# Patient Record
Sex: MALE | Race: WHITE | Employment: FULL TIME | URBAN - METROPOLITAN AREA
[De-identification: names, ages, dates, MRNs, and addresses within clinical notes are randomized per-mention and may not be internally consistent; named-entity substitution may affect disease eponyms.]

---

## 2023-03-02 ENCOUNTER — HOSPITAL ENCOUNTER (INPATIENT)
Age: 58
LOS: 1 days | Discharge: PSYCHIATRIC OR SUBSTANCE USE FACILITY | End: 2023-03-04
Attending: INTERNAL MEDICINE | Admitting: INTERNAL MEDICINE
Payer: COMMERCIAL

## 2023-03-02 DIAGNOSIS — M62.82 NON-TRAUMATIC RHABDOMYOLYSIS: ICD-10-CM

## 2023-03-02 DIAGNOSIS — R45.851 SUICIDAL IDEATION: Primary | ICD-10-CM

## 2023-03-02 LAB
ACETAMINOPHEN LEVEL: <5 UG/ML (ref 10–30)
ALBUMIN SERPL-MCNC: 4.5 G/DL (ref 3.5–4.6)
ALP BLD-CCNC: 83 U/L (ref 35–104)
ALT SERPL-CCNC: 96 U/L (ref 0–41)
AMPHETAMINE SCREEN, URINE: NORMAL
ANION GAP SERPL CALCULATED.3IONS-SCNC: 12 MEQ/L (ref 9–15)
AST SERPL-CCNC: 160 U/L (ref 0–40)
BACTERIA: NEGATIVE /HPF
BARBITURATE SCREEN URINE: NORMAL
BASOPHILS ABSOLUTE: 0 K/UL (ref 0–0.2)
BASOPHILS RELATIVE PERCENT: 0.2 %
BENZODIAZEPINE SCREEN, URINE: NORMAL
BILIRUB SERPL-MCNC: 2.3 MG/DL (ref 0.2–0.7)
BILIRUBIN URINE: ABNORMAL
BLOOD, URINE: NEGATIVE
BUN BLDV-MCNC: 23 MG/DL (ref 6–20)
CALCIUM SERPL-MCNC: 9.5 MG/DL (ref 8.5–9.9)
CANNABINOID SCREEN URINE: NORMAL
CHLORIDE BLD-SCNC: 96 MEQ/L (ref 95–107)
CHOLESTEROL, TOTAL: 142 MG/DL (ref 0–199)
CLARITY: CLEAR
CO2: 28 MEQ/L (ref 20–31)
COCAINE METABOLITE SCREEN URINE: NORMAL
COLOR: ABNORMAL
CREAT SERPL-MCNC: 1.06 MG/DL (ref 0.7–1.2)
EOSINOPHILS ABSOLUTE: 0 K/UL (ref 0–0.7)
EOSINOPHILS RELATIVE PERCENT: 0.3 %
EPITHELIAL CELLS, UA: ABNORMAL /HPF (ref 0–5)
ETHANOL PERCENT: NORMAL G/DL
ETHANOL: <10 MG/DL (ref 0–0.08)
FENTANYL SCREEN, URINE: NORMAL
FINE CASTS, UA: ABNORMAL /LPF (ref 0–5)
GFR SERPL CREATININE-BSD FRML MDRD: >60 ML/MIN/{1.73_M2}
GLOBULIN: 2.8 G/DL (ref 2.3–3.5)
GLUCOSE BLD-MCNC: 107 MG/DL (ref 70–99)
GLUCOSE URINE: NEGATIVE MG/DL
HCT VFR BLD CALC: 46.4 % (ref 42–52)
HDLC SERPL-MCNC: 58 MG/DL (ref 40–59)
HEMOGLOBIN: 15.3 G/DL (ref 14–18)
HYALINE CASTS: ABNORMAL /HPF (ref 0–5)
KETONES, URINE: 40 MG/DL
LDL CHOLESTEROL CALCULATED: 69 MG/DL (ref 0–129)
LEUKOCYTE ESTERASE, URINE: NEGATIVE
LYMPHOCYTES ABSOLUTE: 1.2 K/UL (ref 1–4.8)
LYMPHOCYTES RELATIVE PERCENT: 9.1 %
Lab: NORMAL
MCH RBC QN AUTO: 28.2 PG (ref 27–31.3)
MCHC RBC AUTO-ENTMCNC: 33 % (ref 33–37)
MCV RBC AUTO: 85.5 FL (ref 79–92.2)
METHADONE SCREEN, URINE: NORMAL
MONOCYTES ABSOLUTE: 1.2 K/UL (ref 0.2–0.8)
MONOCYTES RELATIVE PERCENT: 9.7 %
NEUTROPHILS ABSOLUTE: 10.2 K/UL (ref 1.4–6.5)
NEUTROPHILS RELATIVE PERCENT: 80.7 %
NITRITE, URINE: NEGATIVE
OPIATE SCREEN URINE: NORMAL
OXYCODONE URINE: NORMAL
PDW BLD-RTO: 15.1 % (ref 11.5–14.5)
PH UA: 5.5 (ref 5–9)
PHENCYCLIDINE SCREEN URINE: NORMAL
PLATELET # BLD: 242 K/UL (ref 130–400)
POTASSIUM SERPL-SCNC: 3.9 MEQ/L (ref 3.4–4.9)
PROPOXYPHENE SCREEN: NORMAL
PROTEIN UA: 30 MG/DL
RBC # BLD: 5.43 M/UL (ref 4.7–6.1)
RBC UA: ABNORMAL /HPF (ref 0–5)
SALICYLATE, SERUM: <0.3 MG/DL (ref 15–30)
SARS-COV-2, NAAT: NOT DETECTED
SODIUM BLD-SCNC: 136 MEQ/L (ref 135–144)
SPECIFIC GRAVITY UA: 1.03 (ref 1–1.03)
TOTAL CK: 5668 U/L (ref 0–190)
TOTAL PROTEIN: 7.3 G/DL (ref 6.3–8)
TRIGL SERPL-MCNC: 76 MG/DL (ref 0–150)
TSH SERPL DL<=0.05 MIU/L-ACNC: 2.88 UIU/ML (ref 0.44–3.86)
URINE REFLEX TO CULTURE: ABNORMAL
UROBILINOGEN, URINE: 1 E.U./DL
WBC # BLD: 12.7 K/UL (ref 4.8–10.8)
WBC UA: ABNORMAL /HPF (ref 0–5)

## 2023-03-02 PROCEDURE — 80143 DRUG ASSAY ACETAMINOPHEN: CPT

## 2023-03-02 PROCEDURE — 36415 COLL VENOUS BLD VENIPUNCTURE: CPT

## 2023-03-02 PROCEDURE — 80307 DRUG TEST PRSMV CHEM ANLYZR: CPT

## 2023-03-02 PROCEDURE — 82077 ASSAY SPEC XCP UR&BREATH IA: CPT

## 2023-03-02 PROCEDURE — 84443 ASSAY THYROID STIM HORMONE: CPT

## 2023-03-02 PROCEDURE — 80053 COMPREHEN METABOLIC PANEL: CPT

## 2023-03-02 PROCEDURE — 80179 DRUG ASSAY SALICYLATE: CPT

## 2023-03-02 PROCEDURE — 85025 COMPLETE CBC W/AUTO DIFF WBC: CPT

## 2023-03-02 PROCEDURE — 80061 LIPID PANEL: CPT

## 2023-03-02 PROCEDURE — 87635 SARS-COV-2 COVID-19 AMP PRB: CPT

## 2023-03-02 PROCEDURE — 99285 EMERGENCY DEPT VISIT HI MDM: CPT

## 2023-03-02 PROCEDURE — 82550 ASSAY OF CK (CPK): CPT

## 2023-03-02 PROCEDURE — 81001 URINALYSIS AUTO W/SCOPE: CPT

## 2023-03-02 RX ORDER — BACITRACIN ZINC 500 [USP'U]/G
OINTMENT TOPICAL ONCE
Status: COMPLETED | OUTPATIENT
Start: 2023-03-02 | End: 2023-03-03

## 2023-03-02 RX ORDER — 0.9 % SODIUM CHLORIDE 0.9 %
2000 INTRAVENOUS SOLUTION INTRAVENOUS ONCE
Status: COMPLETED | OUTPATIENT
Start: 2023-03-02 | End: 2023-03-03

## 2023-03-02 ASSESSMENT — PATIENT HEALTH QUESTIONNAIRE - PHQ9: SUM OF ALL RESPONSES TO PHQ QUESTIONS 1-9: 9

## 2023-03-02 ASSESSMENT — LIFESTYLE VARIABLES
HOW MANY STANDARD DRINKS CONTAINING ALCOHOL DO YOU HAVE ON A TYPICAL DAY: 1 OR 2
HOW OFTEN DO YOU HAVE A DRINK CONTAINING ALCOHOL: 2-4 TIMES A MONTH

## 2023-03-02 ASSESSMENT — ENCOUNTER SYMPTOMS
ANAL BLEEDING: 0
NAUSEA: 0
VOICE CHANGE: 0
SHORTNESS OF BREATH: 0
COUGH: 0
ABDOMINAL DISTENTION: 0
VOMITING: 0
EYE DISCHARGE: 0

## 2023-03-02 ASSESSMENT — PAIN - FUNCTIONAL ASSESSMENT: PAIN_FUNCTIONAL_ASSESSMENT: NONE - DENIES PAIN

## 2023-03-03 PROBLEM — M62.82 RHABDOMYOLYSIS: Status: ACTIVE | Noted: 2023-03-03

## 2023-03-03 LAB
EKG ATRIAL RATE: 74 BPM
EKG P AXIS: 36 DEGREES
EKG P-R INTERVAL: 118 MS
EKG Q-T INTERVAL: 410 MS
EKG QRS DURATION: 102 MS
EKG QTC CALCULATION (BAZETT): 455 MS
EKG R AXIS: 17 DEGREES
EKG T AXIS: 32 DEGREES
EKG VENTRICULAR RATE: 74 BPM
TOTAL CK: 2385 U/L (ref 0–190)
TOTAL CK: 3271 U/L (ref 0–190)

## 2023-03-03 PROCEDURE — 2060000000 HC ICU INTERMEDIATE R&B

## 2023-03-03 PROCEDURE — 96360 HYDRATION IV INFUSION INIT: CPT

## 2023-03-03 PROCEDURE — 6360000002 HC RX W HCPCS: Performed by: INTERNAL MEDICINE

## 2023-03-03 PROCEDURE — 6370000000 HC RX 637 (ALT 250 FOR IP): Performed by: PHYSICIAN ASSISTANT

## 2023-03-03 PROCEDURE — 82550 ASSAY OF CK (CPK): CPT

## 2023-03-03 PROCEDURE — 96361 HYDRATE IV INFUSION ADD-ON: CPT

## 2023-03-03 PROCEDURE — 36415 COLL VENOUS BLD VENIPUNCTURE: CPT

## 2023-03-03 PROCEDURE — 2580000003 HC RX 258: Performed by: PHYSICIAN ASSISTANT

## 2023-03-03 PROCEDURE — 93005 ELECTROCARDIOGRAM TRACING: CPT | Performed by: INTERNAL MEDICINE

## 2023-03-03 PROCEDURE — 2580000003 HC RX 258: Performed by: INTERNAL MEDICINE

## 2023-03-03 RX ORDER — SODIUM CHLORIDE 9 MG/ML
INJECTION, SOLUTION INTRAVENOUS PRN
Status: DISCONTINUED | OUTPATIENT
Start: 2023-03-03 | End: 2023-03-04 | Stop reason: HOSPADM

## 2023-03-03 RX ORDER — 0.9 % SODIUM CHLORIDE 0.9 %
2000 INTRAVENOUS SOLUTION INTRAVENOUS ONCE
Status: COMPLETED | OUTPATIENT
Start: 2023-03-03 | End: 2023-03-03

## 2023-03-03 RX ORDER — ONDANSETRON 2 MG/ML
4 INJECTION INTRAMUSCULAR; INTRAVENOUS EVERY 6 HOURS PRN
Status: DISCONTINUED | OUTPATIENT
Start: 2023-03-03 | End: 2023-03-04 | Stop reason: HOSPADM

## 2023-03-03 RX ORDER — ACETAMINOPHEN 650 MG/1
650 SUPPOSITORY RECTAL EVERY 6 HOURS PRN
Status: DISCONTINUED | OUTPATIENT
Start: 2023-03-03 | End: 2023-03-04 | Stop reason: HOSPADM

## 2023-03-03 RX ORDER — ENOXAPARIN SODIUM 100 MG/ML
40 INJECTION SUBCUTANEOUS DAILY
Status: DISCONTINUED | OUTPATIENT
Start: 2023-03-03 | End: 2023-03-04 | Stop reason: HOSPADM

## 2023-03-03 RX ORDER — ACETAMINOPHEN 325 MG/1
650 TABLET ORAL EVERY 6 HOURS PRN
Status: DISCONTINUED | OUTPATIENT
Start: 2023-03-03 | End: 2023-03-04 | Stop reason: HOSPADM

## 2023-03-03 RX ORDER — POLYETHYLENE GLYCOL 3350 17 G/17G
17 POWDER, FOR SOLUTION ORAL DAILY PRN
Status: DISCONTINUED | OUTPATIENT
Start: 2023-03-03 | End: 2023-03-04 | Stop reason: HOSPADM

## 2023-03-03 RX ORDER — ONDANSETRON 4 MG/1
4 TABLET, ORALLY DISINTEGRATING ORAL EVERY 8 HOURS PRN
Status: DISCONTINUED | OUTPATIENT
Start: 2023-03-03 | End: 2023-03-04 | Stop reason: HOSPADM

## 2023-03-03 RX ORDER — SODIUM CHLORIDE 0.9 % (FLUSH) 0.9 %
5-40 SYRINGE (ML) INJECTION EVERY 12 HOURS SCHEDULED
Status: DISCONTINUED | OUTPATIENT
Start: 2023-03-03 | End: 2023-03-04 | Stop reason: HOSPADM

## 2023-03-03 RX ORDER — SODIUM CHLORIDE 0.9 % (FLUSH) 0.9 %
5-40 SYRINGE (ML) INJECTION PRN
Status: DISCONTINUED | OUTPATIENT
Start: 2023-03-03 | End: 2023-03-04 | Stop reason: HOSPADM

## 2023-03-03 RX ORDER — SODIUM CHLORIDE 9 MG/ML
INJECTION, SOLUTION INTRAVENOUS CONTINUOUS
Status: DISCONTINUED | OUTPATIENT
Start: 2023-03-03 | End: 2023-03-04 | Stop reason: HOSPADM

## 2023-03-03 RX ADMIN — SODIUM CHLORIDE: 9 INJECTION, SOLUTION INTRAVENOUS at 13:19

## 2023-03-03 RX ADMIN — ENOXAPARIN SODIUM 40 MG: 100 INJECTION SUBCUTANEOUS at 18:56

## 2023-03-03 RX ADMIN — SODIUM CHLORIDE: 9 INJECTION, SOLUTION INTRAVENOUS at 21:05

## 2023-03-03 RX ADMIN — SODIUM CHLORIDE 2000 ML: 9 INJECTION, SOLUTION INTRAVENOUS at 04:05

## 2023-03-03 RX ADMIN — Medication 10 ML: at 21:05

## 2023-03-03 RX ADMIN — BACITRACIN ZINC: 500 OINTMENT TOPICAL at 04:04

## 2023-03-03 RX ADMIN — SODIUM CHLORIDE 2000 ML: 9 INJECTION, SOLUTION INTRAVENOUS at 00:07

## 2023-03-03 ASSESSMENT — LIFESTYLE VARIABLES
HOW OFTEN DO YOU HAVE A DRINK CONTAINING ALCOHOL: MONTHLY OR LESS
HOW MANY STANDARD DRINKS CONTAINING ALCOHOL DO YOU HAVE ON A TYPICAL DAY: 1 OR 2

## 2023-03-03 NOTE — PROGRESS NOTES
Nutrition Note    Trigger received for home PN/EN, data entered in error.   No assessment indicated, follow per LOS protocol    Electronically signed by Gracia Rubin RD, LD on 3/3/23 at 11:32 AM EST

## 2023-03-03 NOTE — ED NOTES
Pt laying in bed with eyes closed. Easily arousable. No acute distress noted. Resps even non labored. Skin p/w/d.  1:1 remains at bedside. No needs at this time.      Angel Perez RN  03/03/23 4481

## 2023-03-03 NOTE — ED NOTES
Spoke to [facility] [employer] PA about case. Reuqesting updated, going medical vs CK under 1k, so nurse may dispo with doctor when he comes on. States she will review and get back.      Agapito Silvestre RN  03/03/23 9054

## 2023-03-03 NOTE — ED NOTES
Patient changed into psych safe clothing. Urine and COVID obtained and sent to lab. Skin and cobtraband check performed. Contraband check negative at this time. Lab called to draw blood.      Teresa Alvarez RN  03/02/23 0970
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
## 2023-03-03 NOTE — ACP (ADVANCE CARE PLANNING)
Advance Care Planning     Advance Care Planning Inpatient Note  [facility] Department    Today's Date: 3/3/2023  Unit: MLOZ 1W TELEMETRY    Received request from admission screening and patient. Upon review of chart and communication with care team, patient's decision making abilities are not in question. . Patient was/were present in the room during visit. Goals of ACP Conversation:  Discuss advance care planning documents    Health Care Decision Makers:       Primary Decision Maker: ReidBeenaKami Edward - Spouse - [phone]    Secondary Decision Maker: Oscar Curtis - Parent - [phone]  Summary:  Verified Healthcare Decision Maker    Advance Care Planning Documents (Patient Wishes):  Healthcare Power of /Advance Directive Appointment of Health Care Agent  Living Will/Advance Directive     Assessment: This patient completed his [address] during this hospitalization. He named his wife, Rodney Antonoi, as his primary medical decision maker. A copy of the document was retained for scanning into Epic. A copy and the original were returned to the patient. Interventions:  Provided education on documents for clarity and greater understanding  Discussed and provided education on state decision maker hierarchy  Assisted in the completion of documents according to patient's wishes at this time  Encouraged ongoing ACP conversation with future decision makers and loved ones    Care Preferences Communicated:   No    Outcomes/Plan:  New advance directive completed. Returned original document(s) to patient, as well as copies for distribution to appointed agents  Copy of advance directive given to staff to scan into medical record. Routed ACP note to attending provider or other IDT member.     Electronically signed by Mae Cornejo, [address] on 3/3/2023 at 2:09 PM

## 2023-03-03 NOTE — ED NOTES
Patient resting quietly. Respirations are even and unlabored. No distress noted at this time.      Markos Villafana RN  03/02/23 0469

## 2023-03-03 NOTE — ED NOTES
Pleasant and cooperative with staff interaction. Open to treatment and recommendations.       Alex Thompson RN  03/02/23 7750
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
## 2023-03-03 NOTE — ED NOTES
Pt is laying in bed sleeping at this time. No acute distress noted. Easily arousable. Resps even non labored. Skin p/w/d.  1:1 remains at bedside. No needs at this time.      Reny Avila RN  03/03/23 7666

## 2023-03-03 NOTE — ED NOTES
Pt is awake. Laying calmly in bed. Pt has a sitter at bedside      Lalitha Lyon RN  03/03/23 8185
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
## 2023-03-04 ENCOUNTER — HOSPITAL ENCOUNTER (INPATIENT)
Age: 58
LOS: 10 days | Discharge: HOME | DRG: 885 | End: 2023-03-14
Attending: PSYCHIATRY & NEUROLOGY | Admitting: PSYCHIATRY & NEUROLOGY
Payer: COMMERCIAL

## 2023-03-04 VITALS
BODY MASS INDEX: 29.41 KG/M2 | HEART RATE: 74 BPM | SYSTOLIC BLOOD PRESSURE: 142 MMHG | RESPIRATION RATE: 18 BRPM | WEIGHT: 183 LBS | OXYGEN SATURATION: 97 % | DIASTOLIC BLOOD PRESSURE: 80 MMHG | TEMPERATURE: 98.8 F | HEIGHT: 66 IN

## 2023-03-04 PROBLEM — F32.2 CURRENT SEVERE EPISODE OF MAJOR DEPRESSIVE DISORDER WITHOUT PSYCHOTIC FEATURES, UNSPECIFIED WHETHER RECURRENT (HCC): Status: ACTIVE | Noted: 2023-03-04

## 2023-03-04 LAB
ANION GAP SERPL CALCULATED.3IONS-SCNC: 6 MEQ/L (ref 9–15)
BUN BLDV-MCNC: 13 MG/DL (ref 6–20)
CALCIUM SERPL-MCNC: 7.9 MG/DL (ref 8.5–9.9)
CHLORIDE BLD-SCNC: 107 MEQ/L (ref 95–107)
CO2: 26 MEQ/L (ref 20–31)
CREAT SERPL-MCNC: 0.82 MG/DL (ref 0.7–1.2)
GFR SERPL CREATININE-BSD FRML MDRD: >60 ML/MIN/{1.73_M2}
GLUCOSE BLD-MCNC: 102 MG/DL (ref 70–99)
POTASSIUM SERPL-SCNC: 3.4 MEQ/L (ref 3.4–4.9)
SODIUM BLD-SCNC: 139 MEQ/L (ref 135–144)
TOTAL CK: 1230 U/L (ref 0–190)

## 2023-03-04 PROCEDURE — 80048 BASIC METABOLIC PNL TOTAL CA: CPT

## 2023-03-04 PROCEDURE — 2580000003 HC RX 258: Performed by: INTERNAL MEDICINE

## 2023-03-04 PROCEDURE — 1240000000 HC EMOTIONAL WELLNESS R&B

## 2023-03-04 PROCEDURE — 82550 ASSAY OF CK (CPK): CPT

## 2023-03-04 PROCEDURE — 6360000002 HC RX W HCPCS: Performed by: INTERNAL MEDICINE

## 2023-03-04 PROCEDURE — 36415 COLL VENOUS BLD VENIPUNCTURE: CPT

## 2023-03-04 RX ORDER — ENOXAPARIN SODIUM 100 MG/ML
40 INJECTION SUBCUTANEOUS DAILY
Status: CANCELLED | OUTPATIENT
Start: 2023-03-04

## 2023-03-04 RX ORDER — SODIUM CHLORIDE 0.9 % (FLUSH) 0.9 %
5-40 SYRINGE (ML) INJECTION PRN
Status: DISCONTINUED | OUTPATIENT
Start: 2023-03-04 | End: 2023-03-04

## 2023-03-04 RX ORDER — SODIUM CHLORIDE 0.9 % (FLUSH) 0.9 %
5-40 SYRINGE (ML) INJECTION PRN
Status: CANCELLED | OUTPATIENT
Start: 2023-03-04

## 2023-03-04 RX ORDER — ACETAMINOPHEN 325 MG/1
650 TABLET ORAL EVERY 4 HOURS PRN
Status: CANCELLED | OUTPATIENT
Start: 2023-03-04

## 2023-03-04 RX ORDER — SODIUM CHLORIDE 0.9 % (FLUSH) 0.9 %
5-40 SYRINGE (ML) INJECTION EVERY 12 HOURS SCHEDULED
Status: CANCELLED | OUTPATIENT
Start: 2023-03-04

## 2023-03-04 RX ORDER — HALOPERIDOL 5 MG/1
5 TABLET ORAL EVERY 4 HOURS PRN
Status: DISCONTINUED | OUTPATIENT
Start: 2023-03-04 | End: 2023-03-14 | Stop reason: HOSPADM

## 2023-03-04 RX ORDER — ONDANSETRON 4 MG/1
4 TABLET, ORALLY DISINTEGRATING ORAL EVERY 8 HOURS PRN
Status: DISCONTINUED | OUTPATIENT
Start: 2023-03-04 | End: 2023-03-14 | Stop reason: HOSPADM

## 2023-03-04 RX ORDER — ONDANSETRON 4 MG/1
4 TABLET, ORALLY DISINTEGRATING ORAL EVERY 8 HOURS PRN
Status: CANCELLED | OUTPATIENT
Start: 2023-03-04

## 2023-03-04 RX ORDER — POLYETHYLENE GLYCOL 3350 17 G/17G
17 POWDER, FOR SOLUTION ORAL DAILY PRN
Status: DISCONTINUED | OUTPATIENT
Start: 2023-03-04 | End: 2023-03-09

## 2023-03-04 RX ORDER — ACETAMINOPHEN 325 MG/1
650 TABLET ORAL EVERY 4 HOURS PRN
Status: DISCONTINUED | OUTPATIENT
Start: 2023-03-04 | End: 2023-03-14 | Stop reason: HOSPADM

## 2023-03-04 RX ORDER — HALOPERIDOL 5 MG/1
5 TABLET ORAL EVERY 4 HOURS PRN
Status: CANCELLED | OUTPATIENT
Start: 2023-03-04

## 2023-03-04 RX ORDER — TRAZODONE HYDROCHLORIDE 50 MG/1
50 TABLET ORAL NIGHTLY PRN
Status: CANCELLED | OUTPATIENT
Start: 2023-03-04

## 2023-03-04 RX ORDER — POLYETHYLENE GLYCOL 3350 17 G/17G
17 POWDER, FOR SOLUTION ORAL DAILY PRN
Status: DISCONTINUED | OUTPATIENT
Start: 2023-03-04 | End: 2023-03-14 | Stop reason: HOSPADM

## 2023-03-04 RX ORDER — HYDROXYZINE HYDROCHLORIDE 25 MG/1
50 TABLET, FILM COATED ORAL 3 TIMES DAILY PRN
Status: DISCONTINUED | OUTPATIENT
Start: 2023-03-04 | End: 2023-03-14 | Stop reason: HOSPADM

## 2023-03-04 RX ORDER — HALOPERIDOL 5 MG/ML
5 INJECTION INTRAMUSCULAR EVERY 4 HOURS PRN
Status: CANCELLED | OUTPATIENT
Start: 2023-03-04

## 2023-03-04 RX ORDER — ACETAMINOPHEN 650 MG/1
650 SUPPOSITORY RECTAL EVERY 6 HOURS PRN
Status: DISCONTINUED | OUTPATIENT
Start: 2023-03-04 | End: 2023-03-09

## 2023-03-04 RX ORDER — HALOPERIDOL 5 MG/ML
5 INJECTION INTRAMUSCULAR EVERY 4 HOURS PRN
Status: DISCONTINUED | OUTPATIENT
Start: 2023-03-04 | End: 2023-03-14 | Stop reason: HOSPADM

## 2023-03-04 RX ORDER — ONDANSETRON 2 MG/ML
4 INJECTION INTRAMUSCULAR; INTRAVENOUS EVERY 6 HOURS PRN
Status: DISCONTINUED | OUTPATIENT
Start: 2023-03-04 | End: 2023-03-09

## 2023-03-04 RX ORDER — ONDANSETRON 2 MG/ML
4 INJECTION INTRAMUSCULAR; INTRAVENOUS EVERY 6 HOURS PRN
Status: CANCELLED | OUTPATIENT
Start: 2023-03-04

## 2023-03-04 RX ORDER — ACETAMINOPHEN 325 MG/1
650 TABLET ORAL EVERY 6 HOURS PRN
Status: DISCONTINUED | OUTPATIENT
Start: 2023-03-04 | End: 2023-03-09

## 2023-03-04 RX ORDER — SODIUM CHLORIDE 0.9 % (FLUSH) 0.9 %
5-40 SYRINGE (ML) INJECTION EVERY 12 HOURS SCHEDULED
Status: DISCONTINUED | OUTPATIENT
Start: 2023-03-04 | End: 2023-03-04

## 2023-03-04 RX ORDER — ACETAMINOPHEN 650 MG/1
650 SUPPOSITORY RECTAL EVERY 6 HOURS PRN
Status: CANCELLED | OUTPATIENT
Start: 2023-03-04

## 2023-03-04 RX ORDER — NICOTINE 21 MG/24HR
1 PATCH, TRANSDERMAL 24 HOURS TRANSDERMAL DAILY
Status: CANCELLED | OUTPATIENT
Start: 2023-03-04

## 2023-03-04 RX ORDER — NICOTINE 21 MG/24HR
1 PATCH, TRANSDERMAL 24 HOURS TRANSDERMAL DAILY
Status: DISCONTINUED | OUTPATIENT
Start: 2023-03-04 | End: 2023-03-04

## 2023-03-04 RX ORDER — TRAZODONE HYDROCHLORIDE 50 MG/1
50 TABLET ORAL NIGHTLY PRN
Status: DISCONTINUED | OUTPATIENT
Start: 2023-03-04 | End: 2023-03-14 | Stop reason: HOSPADM

## 2023-03-04 RX ORDER — ACETAMINOPHEN 325 MG/1
650 TABLET ORAL EVERY 6 HOURS PRN
Status: CANCELLED | OUTPATIENT
Start: 2023-03-04

## 2023-03-04 RX ORDER — HYDROXYZINE HYDROCHLORIDE 25 MG/1
50 TABLET, FILM COATED ORAL 3 TIMES DAILY PRN
Status: CANCELLED | OUTPATIENT
Start: 2023-03-04

## 2023-03-04 RX ORDER — POLYETHYLENE GLYCOL 3350 17 G/17G
17 POWDER, FOR SOLUTION ORAL DAILY PRN
Status: CANCELLED | OUTPATIENT
Start: 2023-03-04

## 2023-03-04 RX ORDER — ENOXAPARIN SODIUM 100 MG/ML
40 INJECTION SUBCUTANEOUS DAILY
Status: DISCONTINUED | OUTPATIENT
Start: 2023-03-05 | End: 2023-03-06

## 2023-03-04 RX ADMIN — Medication 10 ML: at 09:26

## 2023-03-04 RX ADMIN — ENOXAPARIN SODIUM 40 MG: 100 INJECTION SUBCUTANEOUS at 08:41

## 2023-03-04 RX ADMIN — SODIUM CHLORIDE: 9 INJECTION, SOLUTION INTRAVENOUS at 03:51

## 2023-03-04 ASSESSMENT — SLEEP AND FATIGUE QUESTIONNAIRES
SLEEP PATTERN: DIFFICULTY FALLING ASLEEP;DISTURBED/INTERRUPTED SLEEP
AVERAGE NUMBER OF SLEEP HOURS: 6
DO YOU HAVE DIFFICULTY SLEEPING: YES
DO YOU USE A SLEEP AID: NO

## 2023-03-04 NOTE — DISCHARGE SUMMARY
Discharge Summary    Date: 3/4/2023  Patient Name: Marla Gonsales    YOB: 1965     Age: 56 y.o. Admit Date: 3/2/2023  Discharge Date: 3/4/2023  Discharge Condition: [address]    Admission Diagnosis  Rhabdomyolysis [M62.82]; Suicidal ideation [R45.851]; Non-traumatic rhabdomyolysis [M62.82]      Discharge Diagnosis  Principal Problem:    Rhabdomyolysis  Resolved Problems:    * No resolved hospital problems. [facility] Stay  Narrative of Hospital Course:  She comes with suicidal ideation and suicide attempts. Did receive IV fluids here for rhabdo. Laly July is improving. Patient taking sufficient amount of oral intake. Pt can be discharged to psych    Consultants:  [facility] Sheri Hancock  IP CONSULT TO PSYCHIATRY  IP CONSULT TO PSYCHIATRY    Surgeries/procedures Performed:      Treatments:            Discharge Plan/Disposition:  Home    Hospital/Incidental Findings Requiring Follow Up:    Patient Instructions:    Diet:    Activity:  For number of days (if applicable): Other Instructions:    Provider Follow-Up:   No follow-ups on file.      Significant Diagnostic Studies:    Recent Labs:  Admission on 03/02/2023  Acetaminophen Level                           Date: 03/02/2023  Value: <5 (A)      Ref range: 10 - 30 ug/mL      Status: Final  WBC                                           Date: 03/02/2023  Value: 12.7 (A)    Ref range: 4.8 - 10.8 K/uL    Status: Final  RBC                                           Date: 03/02/2023  Value: 5.43        Ref range: 4.70 - 6.10 M/uL   Status: Final  Hemoglobin                                    Date: 03/02/2023  Value: 15.3        Ref range: 14.0 - 18.0 g/dL   Status: Final  Hematocrit                                    Date: 03/02/2023  Value: 46.4        Ref range: 42.0 - 52.0 %      Status: Final  MCV                                           Date: 03/02/2023  Value: 85.5        Ref range: 79.0 - 92.2 fL     Status: Final  MCH Date: 03/02/2023  Value: 28.2        Ref range: 27.0 - 31.3 pg     Status: Final  MCHC                                          Date: 03/02/2023  Value: 33.0        Ref range: 33.0 - 37.0 %      Status: Final  RDW                                           Date: 03/02/2023  Value: 15.1 (A)    Ref range: 11.5 - 14.5 %      Status: Final  Platelets                                     Date: 03/02/2023  Value: 242         Ref range: 130 - 400 K/uL     Status: Final  Neutrophils %                                 Date: 03/02/2023  Value: 80.7        Ref range: %                  Status: Final  Lymphocytes %                                 Date: 03/02/2023  Value: 9.1         Ref range: %                  Status: Final  Monocytes %                                   Date: 03/02/2023  Value: 9.7         Ref range: %                  Status: Final  Eosinophils %                                 Date: 03/02/2023  Value: 0.3         Ref range: %                  Status: Final  Basophils %                                   Date: 03/02/2023  Value: 0.2         Ref range: %                  Status: Final  Neutrophils Absolute                          Date: 03/02/2023  Value: 10.2 (A)    Ref range: 1.4 - 6.5 K/uL     Status: Final  Lymphocytes Absolute                          Date: 03/02/2023  Value: 1.2         Ref range: 1.0 - 4.8 K/uL     Status: Final  Monocytes Absolute                            Date: 03/02/2023  Value: 1.2 (A)     Ref range: 0.2 - 0.8 K/uL     Status: Final  Eosinophils Absolute                          Date: 03/02/2023  Value: 0.0         Ref range: 0.0 - 0.7 K/uL     Status: Final  Basophils Absolute                            Date: 03/02/2023  Value: 0.0         Ref range: 0.0 - 0.2 K/uL     Status: Final  Total CK                                      Date: 03/02/2023  Value: 5,668 (A)   Ref range: 0 - 190 U/L        Status: Final  Sodium                                        Date: 03/02/2023  Value: 136         Ref range: 135 - 144 mEq/L    Status: Final  Potassium                                     Date: 03/02/2023  Value: 3.9         Ref range: 3.4 - 4.9 mEq/L    Status: Final  Chloride                                      Date: 03/02/2023  Value: 96          Ref range: 95 - 107 mEq/L     Status: Final  CO2                                           Date: 03/02/2023  Value: 28          Ref range: 20 - 31 mEq/L      Status: Final  Anion Gap                                     Date: 03/02/2023  Value: 12          Ref range: 9 - 15 mEq/L       Status: Final  Glucose                                       Date: 03/02/2023  Value: 107 (A)     Ref range: 70 - 99 mg/dL      Status: Final  BUN                                           Date: 03/02/2023  Value: 23 (A)      Ref range: 6 - 20 mg/dL       Status: Final  Creatinine                                    Date: 03/02/2023  Value: 1.06        Ref range: 0.70 - 1.20 mg/dL  Status: Final  Est, Glom Filt Rate                           Date: 03/02/2023  Value: >60.0       Ref range: >60                Status: Final                Comment: Pediatric calculator link  [email or web address]/gfr_calculatorped  Effective Oct 3, 2022  These results are not intended for use in patients  <25years of age. eGFR results are calculated without  a race factor using the 2021 CKD-EPI equation. Careful  clinical correlation is recommended, particularly when  comparing to results calculated using previous equations. The CKD-EPI equation is less accurate in patients with  extremes of muscle mass, extra-renal metabolism of  creatinine, excessive creatinine ingestion, or following  therapy that affects renal tubular secretion.     Calcium                                       Date: 03/02/2023  Value: 9.5         Ref range: 8.5 - 9.9 mg/dL    Status: Final  Total Protein                                 Date: 03/02/2023  Value: 7.3         Ref range: 6.3 - 8.0 g/dL     Status: Final  Albumin                                       Date: 03/02/2023  Value: 4.5         Ref range: 3.5 - 4.6 g/dL     Status: Final  Total Bilirubin                               Date: 03/02/2023  Value: 2.3 (A)     Ref range: 0.2 - 0.7 mg/dL    Status: Final  Alkaline Phosphatase                          Date: 03/02/2023  Value: 83          Ref range: 35 - 104 U/L       Status: Final  ALT                                           Date: 03/02/2023  Value: 96 (A)      Ref range: 0 - 41 U/L         Status: Final  AST                                           Date: 03/02/2023  Value: 160 (A)     Ref range: 0 - 40 U/L         Status: Final  Globulin                                      Date: 03/02/2023  Value: 2.8         Ref range: 2.3 - 3.5 g/dL     Status: Final  SARS-CoV-2, NAAT                              Date: 03/02/2023  Value: Not Detected                     Ref range: Not Detected       Status: Final                Comment: Rapid NAAT:   Negative results should be treated as presumptive and,  if inconsistent with clinical signs and symptoms or necessary for  patient management, should be tested with an alternative molecular  assay. Negative results do not preclude SARS-CoV-2 infection and  should not be used as the sole basis for patient management decisions. This test has been authorized by the FDA under an Emergency Use  Authorization (EUA) for use by authorized laboratories.     Fact sheet for Healthcare Providers:  http://www.weston.shira/  Fact sheet for Patients: http://www.[facility]on.shira/    METHODOLOGY: Isothermal Nucleic Acid Amplification    Ventricular Rate                              Date: 03/03/2023  Value: 74          Ref range: BPM                Status: Final  Atrial Rate                                   Date: 03/03/2023  Value: 74          Ref range: BPM                Status: Final  P-R Interval Date: 03/03/2023  Value: 118         Ref range: ms                 Status: Final  QRS Duration                                  Date: 03/03/2023  Value: 102         Ref range: ms                 Status: Final  Q-T Interval                                  Date: 03/03/2023  Value: 410         Ref range: ms                 Status: Final  QTc Calculation (Bazett)                      Date: 03/03/2023  Value: 455         Ref range: ms                 Status: Final  P Axis                                        Date: 03/03/2023  Value: 36          Ref range: degrees            Status: Final  R Axis                                        Date: 03/03/2023  Value: 17          Ref range: degrees            Status: Final  T Axis                                        Date: 03/03/2023  Value: 32          Ref range: degrees            Status: Final  Ethanol Lvl                                   Date: 03/02/2023  Value: <10         Ref range: mg/dL              Status: Final  Ethanol percent                               Date: 03/02/2023  Value: Not indicated                     Ref range: G/dL               Status: Final  Color, UA                                     Date: 03/02/2023  Value: DARK YELLOW (A) Ref range: Straw/Yellow       Status: Final  Clarity, UA                                   Date: 03/02/2023  Value: Clear       Ref range: Clear              Status: Final  Glucose, Ur                                   Date: 03/02/2023  Value: Negative    Ref range: Negative mg/dL     Status: Final  Bilirubin Urine                               Date: 03/02/2023  Value: MODERATE (A) Ref range: Negative           Status: Final  Ketones, Urine                                Date: 03/02/2023  Value: 40 (A)      Ref range: Negative mg/dL     Status: Final  Specific Tripp, UA                          Date: 03/02/2023  Value: 1.034       Ref range: 1.005 - 1.030      Status: Final  Blood, Urine Date: 03/02/2023  Value: Negative    Ref range: Negative           Status: Final  pH, UA                                        Date: 03/02/2023  Value: 5.5         Ref range: 5.0 - 9.0          Status: Final  Protein, UA                                   Date: 03/02/2023  Value: 30 (A)      Ref range: Negative mg/dL     Status: Final  Urobilinogen, Urine                           Date: 03/02/2023  Value: 1.0         Ref range: <2.0 E.U./dL       Status: Final  Nitrite, Urine                                Date: 03/02/2023  Value: Negative    Ref range: Negative           Status: Final  Leukocyte Esterase, Urine                     Date: 03/02/2023  Value: Negative    Ref range: Negative           Status: Final  Urine Reflex to Culture                       Date: 03/02/2023  Value: Not Indicated                       Status: Final  Amphetamine Screen, Urine                     Date: 03/02/2023  Value: Neg         Ref range: Negative <1000 n*  Status: Final  Barbiturate Screen, Ur                        Date: 03/02/2023  Value: Neg         Ref range: Negative < 200 n*  Status: Final  Benzodiazepine Screen, Urine                  Date: 03/02/2023  Value: Neg         Ref range: Negative < 200 n*  Status: Final  Cannabinoid Scrn, Ur                          Date: 03/02/2023  Value: Neg         Ref range: Negative < 50 ng*  Status: Final  Cocaine Metabolite Screen, Urine              Date: 03/02/2023  Value: Neg         Ref range: Negative < 300 n*  Status: Final  Opiate Scrn, Ur                               Date: 03/02/2023  Value: Neg         Ref range: Negative < 300 n*  Status: Final  PCP Screen, Urine                             Date: 03/02/2023  Value: Neg         Ref range: Negative < 25 ng*  Status: Final  Methadone Screen, Urine                       Date: 03/02/2023  Value: Neg         Ref range: Negative <300 ng*  Status: Final  Propoxyphene Scrn, Ur                         Date: 03/02/2023  Value: Neg Ref range: Negative <300 ng*  Status: Final  Oxycodone Urine                               Date: 03/02/2023  Value: Neg         Ref range: Negative <100 ng*  Status: Final  FENTANYL SCREEN, URINE                        Date: 03/02/2023  Value: Neg         Ref range: Negative < 50 ng*  Status: Final  Drug Screen Comment:                          Date: 03/02/2023  Value: see below     Status: Final                Comment: This method is a screening test to detect only these drug  classes as part of a medical workup. Confirmatory testing  by another method should be ordered if clinically indicated. TSH                                           Date: 03/02/2023  Value: 2.880       Ref range: 0.440 - 3.860 uI*  Status: Final  Salicylate, Serum                             Date: 03/02/2023  Value: <0.3 (A)    Ref range: 15.0 - 30.0 mg/dL  Status: Final                Comment: Anti-pyretic:  3.0-10.0 mg/dL  Anti-inflammatory:  15.0-30.0 mg/dL  Toxic: >30.0 mg/dL    Cholesterol, Total                            Date: 03/02/2023  Value: 142         Ref range: 0 - 199 mg/dL      Status: Final                Comment: ATP III Cholesterol classification is Desirable. Triglycerides                                 Date: 03/02/2023  Value: 76          Ref range: 0 - 150 mg/dL      Status: Final                Comment: ATP III Triglycerides Classification is Normal.  HDL                                           Date: 03/02/2023  Value: 58          Ref range: 40 - 59 mg/dL      Status: Final                Comment: ATP III HDL Cholesterol Classification is Desirable. Expected Values:    Males:    >55 = No Risk            35-55 = Moderate Risk            <35 = High Risk    Females:  >65 = No Risk            45-65 = Moderate Risk            <45 = High Risk    NCEP Guidelines:   Third Report May 2001  >59 = negative risk factor for CHD  <40 = major risk factor for CHD    LDL Calculated                                Date: 03/02/2023  Value: 69          Ref range: 0 - 129 mg/dL      Status: Final                Comment: ATP III LDL Classification is Optimal.  Fine Casts, UA                                Date: 03/02/2023  Value: 1-3         Ref range: 0 - 5 /LPF         Status: Final  Bacteria, UA                                  Date: 03/02/2023  Value: Negative    Ref range: Negative /HPF      Status: Final  Hyaline Casts, UA                             Date: 03/02/2023  Value: 10-20       Ref range: 0 - 5 /HPF         Status: Final  WBC, UA                                       Date: 03/02/2023  Value: 3-5         Ref range: 0 - 5 /HPF         Status: Final  RBC, UA                                       Date: 03/02/2023  Value: 3-5 (A)     Ref range: 0 - 5 /HPF         Status: Final  Epithelial Cells, UA                          Date: 03/02/2023  Value: 3-5         Ref range: 0 - 5 /HPF         Status: Final  Total CK                                      Date: 03/03/2023  Value: 3,271 (A)   Ref range: 0 - 190 U/L        Status: Final  Total CK                                      Date: 03/03/2023  Value: 2,385 (A)   Ref range: 0 - 190 U/L        Status: Final  Sodium                                        Date: 03/04/2023  Value: 139         Ref range: 135 - 144 mEq/L    Status: Final  Potassium                                     Date: 03/04/2023  Value: 3.4         Ref range: 3.4 - 4.9 mEq/L    Status: Final  Chloride                                      Date: 03/04/2023  Value: 107         Ref range: 95 - 107 mEq/L     Status: Final  CO2                                           Date: 03/04/2023  Value: 26          Ref range: 20 - 31 mEq/L      Status: Final  Anion Gap                                     Date: 03/04/2023  Value: 6 (A)       Ref range: 9 - 15 mEq/L       Status: Final  Glucose                                       Date: 03/04/2023  Value: 102 (A)     Ref range: 70 - 99 mg/dL      Status: Final  BUN Date: 03/04/2023  Value: 13          Ref range: 6 - 20 mg/dL       Status: Final  Creatinine                                    Date: 03/04/2023  Value: 0.82        Ref range: 0.70 - 1.20 mg/dL  Status: Final  Est, [place] Filt Rate                           Date: 03/04/2023  Value: >60.0       Ref range: >60                Status: Final                Comment: Pediatric calculator link  [email or web address]/gfr_calculatorped  Effective Oct 3, 2022  These results are not intended for use in patients  <25years of age. eGFR results are calculated without  a race factor using the 2021 CKD-EPI equation. Careful  clinical correlation is recommended, particularly when  comparing to results calculated using previous equations. The CKD-EPI equation is less accurate in patients with  extremes of muscle mass, extra-renal metabolism of  creatinine, excessive creatinine ingestion, or following  therapy that affects renal tubular secretion. Calcium                                       Date: 03/04/2023  Value: 7.9 (A)     Ref range: 8.5 - 9.9 mg/dL    Status: Final  Total CK                                      Date: 03/04/2023  Value: 1,230 (A)   Ref range: 0 - 190 U/L        Status: Final  ------------    Radiology last 7 days:  No results found. [unfilled]    Discharge Medications    There are no discharge medications for this patient. There are no discharge medications for this patient. There are no discharge medications for this patient. There are no discharge medications for this patient. Time Spent on Discharge:  minutes were spent in patient examination, evaluation, counseling as well as medication reconciliation, prescriptions for required medications, discharge plan, and follow up.     Electronically signed by Ninfa Borrero MD on 3/4/23 at 10:34 AM EST   Overtime on dc summary was 45 min
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
## 2023-03-04 NOTE — FLOWSHEET NOTE
Patient a&ox4 medically stable at this time to transfer to psych as ordered patient in agreement signed voluntary consent verbal report given to oTi Hyman on receiving unit patient quietly sitting in bed 1on1 sitter at bedside police present to assist with transport waiting on transport at this time

## 2023-03-04 NOTE — FLOWSHEET NOTE
Pt admitted to room 182. Pt is a on on one for suicidal ideation. Pt currently denies having any suicidal thoughts. Pt is pleasant and cooperative.
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
## 2023-03-04 NOTE — PLAN OF CARE
Problem: Self Harm/Suicidality  Goal: Will have no self-injury during hospital stay  Description: INTERVENTIONS:  1. Ensure constant observer at bedside with Q15M safety checks  2. Maintain a safe environment  3. Secure patient belongings  4. Ensure family/visitors adhere to safety recommendations  5. Ensure safety tray has been added to patient's diet order  6.   Every shift and PRN: Re-assess suicidal risk via Frequent Screener    Outcome: Progressing     Problem: Discharge Planning  Goal: Discharge to home or other facility with appropriate resources  Outcome: Progressing     Problem: Safety - Adult  Goal: Free from fall injury  Outcome: Progressing

## 2023-03-05 LAB
ANION GAP SERPL CALCULATED.3IONS-SCNC: 8 MEQ/L (ref 9–15)
BUN BLDV-MCNC: 10 MG/DL (ref 6–20)
CALCIUM SERPL-MCNC: 8.5 MG/DL (ref 8.5–9.9)
CHLORIDE BLD-SCNC: 108 MEQ/L (ref 95–107)
CO2: 28 MEQ/L (ref 20–31)
CREAT SERPL-MCNC: 0.81 MG/DL (ref 0.7–1.2)
GFR SERPL CREATININE-BSD FRML MDRD: >60 ML/MIN/{1.73_M2}
GLUCOSE BLD-MCNC: 87 MG/DL (ref 70–99)
POTASSIUM SERPL-SCNC: 3.9 MEQ/L (ref 3.4–4.9)
SODIUM BLD-SCNC: 144 MEQ/L (ref 135–144)
TOTAL CK: 869 U/L (ref 0–190)

## 2023-03-05 PROCEDURE — 80048 BASIC METABOLIC PNL TOTAL CA: CPT

## 2023-03-05 PROCEDURE — 6370000000 HC RX 637 (ALT 250 FOR IP): Performed by: PSYCHIATRY & NEUROLOGY

## 2023-03-05 PROCEDURE — 36415 COLL VENOUS BLD VENIPUNCTURE: CPT

## 2023-03-05 PROCEDURE — 82550 ASSAY OF CK (CPK): CPT

## 2023-03-05 PROCEDURE — 1240000000 HC EMOTIONAL WELLNESS R&B

## 2023-03-05 RX ORDER — PAROXETINE 10 MG/1
10 TABLET, FILM COATED ORAL DAILY
Status: DISCONTINUED | OUTPATIENT
Start: 2023-03-05 | End: 2023-03-07

## 2023-03-05 RX ADMIN — PAROXETINE 10 MG: 10 TABLET, FILM COATED ORAL at 11:00

## 2023-03-05 NOTE — CARE COORDINATION
Brief Intervention and Referral to Treatment Summary    Patient was provided PHQ-9, AUDIT-C and DAST Screening:      PHQ-9 Score: 9  AUDIT-C Score: 1  DAST Score: 0     Patients substance use is considered     Low Risk/Healthy x  Moderate Risk  Harmful  Dependent    Patients depression is considered:     Minimal x  Mild   Moderate  Moderately Severe  Severe    Brief Education Was Provided N/A    Patient was receptive  Patient was not receptive      Brief Intervention Is Provided (Only for AUDIT-C or DAST) N/A    Patient reports readiness to decrease and/or stop use and a plan was discussed   Patient denies readiness to decrease and/or stop use and a plan was not discussed      Recommendations/Referrals for Brief and/or Specialized Treatment Provided to Patient    Patient stated he does not have a problem with drugs or alcohol. As a result, no brief education or intervention was completed.     Electronically signed by Ho Scherer on 3/5/2023 at 10:50 AM
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
## 2023-03-05 NOTE — GROUP NOTE
Group Therapy Note    Date: 3/5/2023    Group Start Time: 1430  Group End Time: 1500  Group Topic: Healthy Living/Wellness    [facility] 3W GABRIELE Dominguez Che, RN        Group Therapy Note    Attendees: 14/19       Patient's Goal:   learn healthy boundaries    Notes:      Status After Intervention:  Unchanged    Participation Level:  Active Listener    Participation Quality: Appropriate      Speech:  normal      Thought Process/Content: Logical      Affective Functioning: Congruent      Mood: euthymic      Level of consciousness:  Alert      Response to Learning: Progressing to goal      Endings: None Reported    Modes of Intervention: Education      Discipline Responsible: Registered Nurse      Signature:  Fatemeh Hernández RN

## 2023-03-05 NOTE — PLAN OF CARE
Patient is out watching tv. Pleasant and cooperative with his assessment. Pt denies SI/HI and AVH. Pt denies depression. Rates his anxiety 2/10. Pt wants to get on the right track. Denies further needs at this time. Problem: Self Harm/Suicidality  Goal: Will have no self-injury during hospital stay  Description: INTERVENTIONS:  1. Ensure constant observer at bedside with Q15M safety checks  2. Maintain a safe environment  3. Secure patient belongings  4. Ensure family/visitors adhere to safety recommendations  5. Ensure safety tray has been added to patient's diet order  6. Every shift and PRN: Re-assess suicidal risk via Frequent Screener    3/4/2023 2022 by Ladonna Zacarias RN  Outcome: Progressing  3/4/2023 2005 by Pranay Hermosillo RN  Outcome: Progressing  Flowsheets (Taken 3/4/2023 1958)  Will have no self-injury during hospital stay:   Maintain a safe environment   Every shift and PRN: Re-assess suicidal risk via Frequent Screener     Problem: Depression  Goal: Will be euthymic at discharge  Description: INTERVENTIONS:  1. Administer medication as ordered  2. Provide emotional support via 1:1 interaction with staff  3. Encourage involvement in milieu/groups/activities  4. Monitor for social isolation  3/4/2023 2022 by Ladonna Zacarias RN  Outcome: Progressing  3/4/2023 2005 by Pranay Hermosillo RN  Outcome: Progressing     Problem: Behavior  Goal: Pt/Family maintain appropriate behavior and adhere to behavioral management agreement, if implemented  Description: INTERVENTIONS:  1. Assess patient/family's coping skills and  non-compliant behavior (including use of illegal substances)  2. Notify security of behavior or suspected illegal substances which indicate the need for search of the family and/or belongings  3. Encourage verbalization of thoughts and concerns in a socially appropriate manner  4. Utilize positive, consistent limit setting strategies supporting safety of patient, staff and others  5.  Encourage participation in the decision making process about the behavioral management agreement  6. If a visitor's behavior poses a threat to safety call refer to organization policy. 7. Initiate consult with , Psychosocial CNS, Spiritual Care as appropriate  3/4/2023 2022 by Priscilla Jalloh RN  Outcome: Progressing  3/4/2023 2005 by Yvonne Moore RN  Outcome: Progressing     Problem: Anxiety  Goal: Will report anxiety at manageable levels  Description: INTERVENTIONS:  1. Administer medication as ordered  2. Teach and rehearse alternative coping skills  3. Provide emotional support with 1:1 interaction with staff  3/4/2023 2022 by Priscilla Jalloh RN  Outcome: Progressing  3/4/2023 2005 by Yvonne Moore RN  Outcome: Progressing     Problem: Sleep Disturbance  Goal: Will exhibit normal sleeping pattern  Description: INTERVENTIONS:  1. Administer medication as ordered  2. Decrease environmental stimuli, including noise, as appropriate  3.  Discourage social isolation and naps during the day  3/4/2023 2022 by Priscilla Jalloh RN  Outcome: Progressing  3/4/2023 2005 by Yvonne Moore RN  Outcome: Progressing
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
## 2023-03-05 NOTE — PROGRESS NOTES
Morning Community Meeting Topics    Erwin Bullock attended the morning community meeting on 3/5/23. Topics discussed today     [x] Introduction  Day of the week and date  Mask distribution  Current mask requirements  [x]Teams  Explanation of  Green and Blue team criteria  Nurses assigned to each team for today  Explanation about green and blue paper  Date  Patient's Name  Patient's Nurse  Goals  [x] Visitation  Announce the visiting hours for the day  Announce which team is allowed to have visitors for the day  Review any updated Covid 19 requirements for visitors during visitation  Vaccine Card or negative Covid test within 48 hours of visit  State Identification  Patients are reminded to alert the  at least 1 hour before visitation   [x] Unit Orientation  Coffee use  Phone location and etiquette  Shower locations  [place] and dryer location and process  Common area expectations  Staff rounds expectation  [x] Meals   Educate patient to the menu  The patient is encouraged to fill out the menu to get preferences at mealtime  The patient is educated that if they do not fill out the menu, they will get the standard tray  The coffee pot is decaf, patient encouraged to order regular coffee from menu.   Educate patient to the meal process  Patient encouraged to eat snacks provided twice daily  Snacks may stay in patient room     [x] Discharge Process  Discharge expectations  Fill out the survey after discharge   [x] Hygiene  Daily showers encouraged  Showers availability discussed   Daily dressing encouraged  Discussed wearing street clothing  Education provided on where to place linens and clothing  Linens in the hamper  personal clothing does not go into the linen hamper  [x] Group   Patient encouraged to attend group provided  Time of Group Meetings discussed  Gentle reminder that attendance is a Physician order  [x] Movement  Chair exercises completed  Stretching completed  Notes: Goal - \"Maintain a positive outlook\" Electronically signed by Renea Fernandez, [address] on 3/5/2023 at 9:28 AM
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
## 2023-03-05 NOTE — PLAN OF CARE
Patient is guarded with assessment. Patient appears anxious with fair eye contact. Patient denies depression and states his anxiety is \"the same anxiety everybody deals with\". Patient denies SI,HI and hallucinations. Patient reports good sleep and appetite. Pt states he had a good visit with his wife. Problem: Self Harm/Suicidality  Goal: Will have no self-injury during hospital stay  Description: INTERVENTIONS:  1. Ensure constant observer at bedside with Q15M safety checks  2. Maintain a safe environment  3. Secure patient belongings  4. Ensure family/visitors adhere to safety recommendations  5. Ensure safety tray has been added to patient's diet order  6. Every shift and PRN: Re-assess suicidal risk via Frequent Screener    Outcome: Progressing  Flowsheets (Taken 3/5/2023 1515)  Will have no self-injury during hospital stay: Maintain a safe environment     Problem: Depression  Goal: Will be euthymic at discharge  Description: INTERVENTIONS:  1. Administer medication as ordered  2. Provide emotional support via 1:1 interaction with staff  3. Encourage involvement in milieu/groups/activities  4. Monitor for social isolation  Outcome: Progressing     Problem: Behavior  Goal: Pt/Family maintain appropriate behavior and adhere to behavioral management agreement, if implemented  Description: INTERVENTIONS:  1. Assess patient/family's coping skills and  non-compliant behavior (including use of illegal substances)  2. Notify security of behavior or suspected illegal substances which indicate the need for search of the family and/or belongings  3. Encourage verbalization of thoughts and concerns in a socially appropriate manner  4. Utilize positive, consistent limit setting strategies supporting safety of patient, staff and others  5. Encourage participation in the decision making process about the behavioral management agreement  6.  If a visitor's behavior poses a threat to safety call refer to organization policy. 7. Initiate consult with , Psychosocial CNS, Spiritual Care as appropriate  Outcome: Progressing     Problem: Anxiety  Goal: Will report anxiety at manageable levels  Description: INTERVENTIONS:  1. Administer medication as ordered  2. Teach and rehearse alternative coping skills  3. Provide emotional support with 1:1 interaction with staff  Outcome: Progressing  Flowsheets (Taken 3/5/2023 2443)  Will report anxiety at manageable levels:   Provide emotional support with 1:1 interaction with staff   Administer medication as ordered   Teach and rehearse alternative coping skills     Problem: Sleep Disturbance  Goal: Will exhibit normal sleeping pattern  Description: INTERVENTIONS:  1. Administer medication as ordered  2. Decrease environmental stimuli, including noise, as appropriate  3.  Discourage social isolation and naps during the day  Outcome: Progressing

## 2023-03-05 NOTE — GROUP NOTE
Group Therapy Note    Date: 3/5/2023    Group Start Time: 1000  Group End Time: 1055  Group Topic: Psychoeducation    [facility]    Samuel Platt        Group Therapy Note    Attendees: 9/18       Patient's Goal:  \"Maintain a positive outlook\"     Notes:  Patient attended the 1000 skills group. Patient interacted with his peers and he worked fairly on his task.      Status After Intervention:  Unchanged    Participation Level: Fair    Participation Quality: Appropriate      Speech:  normal      Thought Process/Content: Linear      Affective Functioning: Congruent      Mood:  calm      Level of consciousness:  Alert      Response to Learning: Progressing to goal      Endings: None Reported    Modes of Intervention: Education, Socialization, and Activity      Discipline Responsible: Psychoeducational Specialist      Signature:  Samuel Platt

## 2023-03-05 NOTE — CONSULTS
PODIATRIC MEDICINE AND SURGERY  CONSULT HISTORY AND PHYSICAL      Consulting Service:  psych  Opinion/advice regarding: Elongated Toenails  Staff Doctor:  Flores Steele DPM      ASSESSMENT:  This 56 y.o. male with no significant PMH admitted to psych unit for suicidal ideations after being cleared for rhabdomyolysis. Patient with painful tinea unguium. Plan:  Exam and evaluation  Nails 1-5 bilateral debrided in length and thickness without incident using nail nippers  Educated patient on foot care and importance of checking feet daily. HPI: This very pleasant 56y.o. year old male seen today for nail care. Patient states that the toenails on both feet are long, painful, and difficult to trim. Patient states that their pain is 2/10 on the pain scale and is sharp in nature  when the nails haven't been trimmed. Patient denies nausea, vomiting, diarrhea, fevers, chills, chest pain, shortness of breath, head ache, or calf pain. No other pedal complaints. No past medical history on file. No past surgical history on file. No current facility-administered medications on file prior to encounter. No current outpatient medications on file prior to encounter. No Known Allergies    No family history on file.     Social History     Socioeconomic History    Marital status:      Spouse name: Not on file    Number of children: Not on file    Years of education: Not on file    Highest education level: Not on file   Occupational History    Not on file   Tobacco Use    Smoking status: Not on file    Smokeless tobacco: Not on file   Substance and Sexual Activity    Alcohol use: Not on file    Drug use: Not on file    Sexual activity: Not on file   Other Topics Concern    Not on file   Social History Narrative    Not on file     Social Determinants of Health     Financial Resource Strain: Not on file   Food Insecurity: Not on file   Transportation Needs: Not on file   Physical Activity: Not on file   Stress: Not on file   Social Connections: Not on file   Intimate Partner Violence: Not on file   Housing Stability: Not on file         REVIEW OF SYSTEMS:  CONSTITUTIONAL:  No fevers, chills, nightsweats, unintended weight loss  HEENT:  Denies frequent or severe headaches, nasal congestion/sinus symptoms, problematic allergy problems. EYES:  No diplopia or blurry vision. CARDIOVASCULAR:  No chest pain, dyspnea, palpitations, orthopnea  PULM:  No dyspnea, unexplained cough. GI:  No dysphagia/odynophagia, problematic reflux, constipation, diarrhea, changes in stool habits, hematochezia, melena. :  No new urinary complaints, including dysuria, gross hematuria or pyuria. NEURO:  No new balance problems, peripheral weakness/paresthesias or numbness of concern. MUSC-SKEL:  No new joint pain, swelling, or erythema. PSY:  No concerns regarding depression, anxiety or panic. INTEGUMENTARY:  No new skin changes (rash, new or changing mole, new growth)      OBJECTIVE:  /87   Pulse 85   Resp 18   SpO2 95%   Patient is alert and oriented x 3 in NAD. Vascular:  Palpable Dorsalis Pedis and Palpable Posterior Tibial Pulses B/L   Capillary Fill time < 3 seconds to B/L digits  Skin temperature warm to warm tibial tuberosity to the digits B/L  Hair growth present to digits      Neurological:   Epicritic sensation intact B/L    Musculoskeletal/Orthopaedic:   5/5 muscle strength Dorsiflexion, Plantarflexion, Inversion, Eversion B/L  ROM decreased pedal and ankle joints B/L. Dermatological:   Skin appears well hydrated and supple with good temperature, texture, turgor. No hyperkeratosis  noted. Interspaces 1-4 are clear and without debris B/L.   Nails 1 bilateral are thick, elongated, with subungual debris  Nails 2 bilateral are thick, elongated, with subungual debris  Nails 3 bilateral are thick, elongated, with subungual debris  Nails 4 bilateral are thick, elongated, with subungual debris  Nails 5 bilateral are thick, elongated, with subungual debris    Mild pain to palpation of toenails 1-5 B/L    LABS:   Lab Results   Component Value Date    WBC 12.7 (H) 03/02/2023    HGB 15.3 03/02/2023    HCT 46.4 03/02/2023    MCV 85.5 03/02/2023     03/02/2023     Lab Results   Component Value Date/Time     03/05/2023 04:24 AM    K 3.9 03/05/2023 04:24 AM     03/05/2023 04:24 AM    CO2 28 03/05/2023 04:24 AM    BUN 10 03/05/2023 04:24 AM    CREATININE 0.81 03/05/2023 04:24 AM    GLUCOSE 87 03/05/2023 04:24 AM    CALCIUM 8.5 03/05/2023 04:24 AM      Lab Results   Component Value Date    LABALBU 4.5 03/02/2023     No results found for: SEDRATE  No results found for: CRP  No results found for: LABA1C  No results found for: EAG      Patient's case will be discussed with staff, who will provide final recommendations. Thank you for the consult.     Shahid Gandara, DPGLADYS, PGY2  Please first page [facility], [phone]  March 5, 2023  2:30 PM

## 2023-03-05 NOTE — PLAN OF CARE
Pt has been visible on the unit, sitting in the dayroom. Walks with strong, steady gait. Denies SI, HI, AVH. Denies any current anxiety or depression. Reports that he got himself into significant financial trouble by playing the [employer] and wasn't able to tell his wife. Verbalizes motivation for treatment, stating \"I want to learn how to be honest with my wife. \" Expresses remorse for \"letting it get this bad. \" Identifies reasons to live. Denies any previous suicide attempts or self injurious behavior, denies any previous abuse. Denies taking any home medications. Pt showered. Currently sitting in the dayroom. Problem: Self Harm/Suicidality  Goal: Will have no self-injury during hospital stay  Description: INTERVENTIONS:  1. Ensure constant observer at bedside with Q15M safety checks  2. Maintain a safe environment  3. Secure patient belongings  4. Ensure family/visitors adhere to safety recommendations  5. Ensure safety tray has been added to patient's diet order  6. Every shift and PRN: Re-assess suicidal risk via Frequent Screener    Outcome: Progressing  Flowsheets (Taken 3/4/2023 1958)  Will have no self-injury during hospital stay:   Maintain a safe environment   Every shift and PRN: Re-assess suicidal risk via Frequent Screener     Problem: Depression  Goal: Will be euthymic at discharge  Description: INTERVENTIONS:  1. Administer medication as ordered  2. Provide emotional support via 1:1 interaction with staff  3. Encourage involvement in milieu/groups/activities  4. Monitor for social isolation  Outcome: Progressing     Problem: Behavior  Goal: Pt/Family maintain appropriate behavior and adhere to behavioral management agreement, if implemented  Description: INTERVENTIONS:  1. Assess patient/family's coping skills and  non-compliant behavior (including use of illegal substances)  2.  Notify security of behavior or suspected illegal substances which indicate the need for search of the family and/or belongings  3. Encourage verbalization of thoughts and concerns in a socially appropriate manner  4. Utilize positive, consistent limit setting strategies supporting safety of patient, staff and others  5. Encourage participation in the decision making process about the behavioral management agreement  6. If a visitor's behavior poses a threat to safety call refer to organization policy. 7. Initiate consult with , Psychosocial CNS, Spiritual Care as appropriate  Outcome: Progressing     Problem: Anxiety  Goal: Will report anxiety at manageable levels  Description: INTERVENTIONS:  1. Administer medication as ordered  2. Teach and rehearse alternative coping skills  3. Provide emotional support with 1:1 interaction with staff  Outcome: Progressing     Problem: Sleep Disturbance  Goal: Will exhibit normal sleeping pattern  Description: INTERVENTIONS:  1. Administer medication as ordered  2. Decrease environmental stimuli, including noise, as appropriate  3.  Discourage social isolation and naps during the day  Outcome: Progressing

## 2023-03-05 NOTE — CONSULTS
Consult Note            Date:3/5/2023        Patient Name:Peter Linder     YOB: 1965     Age:57 y.o. Reason for Consult: Patient management    Chief Complaint   No chief complaint on file. History Obtained From   patient    History of Present Illness   Patient is a 68-year-old male admitted to psych unit for suicide ideation after being cleared medically for rhabdomyolysis. Current . Patient has no prior medical history. Reports he does not take any home medications. Patient denies chest pain, palpitations, lightheadedness, headache, dizziness, shortness of breath, cough, N/V/D, and changes in appetite. Patient also denies smoking, illicit drug, and alcohol use. Denies self-inflicted injuries or wounds. Past Medical History   No past medical history on file. Past Surgical History   No past surgical history on file. Medications     Prior to Admission medications    Not on File        PARoxetine (PAXIL) tablet 10 mg, Daily  enoxaparin (LOVENOX) injection 40 mg, Daily  ondansetron (ZOFRAN-ODT) disintegrating tablet 4 mg, Q8H PRN   Or  ondansetron (ZOFRAN) injection 4 mg, Q6H PRN  polyethylene glycol (GLYCOLAX) packet 17 g, Daily PRN  acetaminophen (TYLENOL) tablet 650 mg, Q6H PRN   Or  acetaminophen (TYLENOL) suppository 650 mg, Q6H PRN  acetaminophen (TYLENOL) tablet 650 mg, Q4H PRN  polyethylene glycol (GLYCOLAX) packet 17 g, Daily PRN  hydrOXYzine HCl (ATARAX) tablet 50 mg, TID PRN  haloperidol (HALDOL) tablet 5 mg, Q4H PRN   Or  haloperidol lactate (HALDOL) injection 5 mg, Q4H PRN  traZODone (DESYREL) tablet 50 mg, Nightly PRN        Allergies   Patient has no known allergies. Social History     Social History    None         Family History   No family history on file.     Review of Systems   12 point review of systems reviewed with patient with pertinent positives listed in HPI otherwise ROS negative    Physical Exam   /87   Pulse 85   Resp 18   SpO2 95%       CONSTITUTIONAL:  Awake, alert, no apparent distress, and appears stated age  EYES: pupils equal, round and reactive to light   NECK:  Supple   LUNGS:  No increased work of breathing, good air exchange, clear to auscultation bilaterally, no crackles or wheezing  CARDIOVASCULAR:  Normal apical impulse, regular rate and rhythm  ABDOMEN:  No scars, normal bowel sounds, soft, non-distended, non-tender  MUSCULOSKELETAL:  There is no redness, warmth, or swelling of the joints. Full range of motion noted  NEUROLOGIC:  Awake, alert. No focal deficits noted  SKIN:  No bruising or bleeding, normal skin color, texture, turgor, no redness, warmth, or swelling, no rashes, and no lesions    Labs    CBC:  Recent Labs     03/02/23 2013   WBC 12.7*   RBC 5.43   HGB 15.3   HCT 46.4   MCV 85.5   RDW 15.1*        CHEMISTRIES:  Recent Labs     03/02/23 2013 03/04/23  0506 03/05/23  0424    139 144   K 3.9 3.4 3.9   CL 96 107 108*   CO2 28 26 28   BUN 23* 13 10   CREATININE 1.06 0.82 0.81   GLUCOSE 107* 102* 87     PT/INR:No results for input(s): PROTIME, INR in the last 72 hours. APTT:No results for input(s): APTT in the last 72 hours. LIVER PROFILE:  Recent Labs     03/02/23 2013   *   ALT 96*   BILITOT 2.3*   ALKPHOS 83       Imaging/Diagnostics   No results found. Assessment      Hospital Problems             Last Modified POA    * (Principal) Current severe episode of major depressive disorder without psychotic features, unspecified whether recurrent ([facility] [address] 3/4/2023 Yes       Plan   *Major depressive disorder-psychiatry managing    Thank you for consult. Hospital medicine managing acute needs. Patient will need to follow-up with PCP for chronic disease management. Time spent with patient 35 minutes. Greater than 70% of time  spent focused exclusively on this patient ,reviewing  chart,  reconciling medications, &  answering questions with patient and discussing plan.       Electronically signed by JUAN Escamilla CNP on 3/5/23 at 12:40 PM EST

## 2023-03-06 LAB
ANION GAP SERPL CALCULATED.3IONS-SCNC: 6 MEQ/L (ref 9–15)
BUN BLDV-MCNC: 11 MG/DL (ref 6–20)
CALCIUM SERPL-MCNC: 8.8 MG/DL (ref 8.5–9.9)
CHLORIDE BLD-SCNC: 103 MEQ/L (ref 95–107)
CO2: 30 MEQ/L (ref 20–31)
CREAT SERPL-MCNC: 0.79 MG/DL (ref 0.7–1.2)
GFR SERPL CREATININE-BSD FRML MDRD: >60 ML/MIN/{1.73_M2}
GLUCOSE BLD-MCNC: 98 MG/DL (ref 70–99)
POTASSIUM SERPL-SCNC: 3.9 MEQ/L (ref 3.4–4.9)
SODIUM BLD-SCNC: 139 MEQ/L (ref 135–144)
TOTAL CK: 415 U/L (ref 0–190)

## 2023-03-06 PROCEDURE — 99233 SBSQ HOSP IP/OBS HIGH 50: CPT | Performed by: PSYCHIATRY & NEUROLOGY

## 2023-03-06 PROCEDURE — 80048 BASIC METABOLIC PNL TOTAL CA: CPT

## 2023-03-06 PROCEDURE — 6370000000 HC RX 637 (ALT 250 FOR IP): Performed by: PSYCHIATRY & NEUROLOGY

## 2023-03-06 PROCEDURE — 36415 COLL VENOUS BLD VENIPUNCTURE: CPT

## 2023-03-06 PROCEDURE — 82550 ASSAY OF CK (CPK): CPT

## 2023-03-06 PROCEDURE — 1240000000 HC EMOTIONAL WELLNESS R&B

## 2023-03-06 RX ADMIN — PAROXETINE 10 MG: 10 TABLET, FILM COATED ORAL at 09:50

## 2023-03-06 NOTE — FLOWSHEET NOTE
Pt visible on unit . Seen eating meals and social with select peers. Pt reports he was in debt and [place] Pt states he had suicidal thoughts and decided that he did want to kill himself and went to the police station. Pt denies feeling suicidal at this time and denies HI,AVH.

## 2023-03-06 NOTE — PROGRESS NOTES
[facility] FOLLOW-UP NOTE       3/6/2023     Patient was seen and examined in person, Chart reviewed   Patient's case discussed with staff/team    Chief Complaint: SI Depression    Interim History:   Flat, blunted  Reckless spending; gambling   Denies previous mental health history  Endorses significant depression and anxiety  Adequate sleep and appetite  Denies current SI HI AVH; no delusions  Denies substance abuse   Med compliant; denies side effects  Poor motivation; decreased energy     Appetite:   [x] Normal/Unchanged  [] Increased  [] Decreased      Sleep:       [x] Normal/Unchanged  [] Fair       [] Poor              Energy:    [] Normal/Unchanged  [] Increased  [x] Decreased        SI [] Present  [x] Absent    HI  []Present  [x] Absent     Aggression:  [] yes  [x] no    Patient is [x] able  [] unable to CONTRACT FOR SAFETY     PAST MEDICAL/PSYCHIATRIC HISTORY:   No past medical history on file. FAMILY/SOCIAL HISTORY:  No family history on file. Social History     Socioeconomic History    Marital status:      Spouse name: Not on file    Number of children: Not on file    Years of education: Not on file    Highest education level: Not on file   Occupational History    Not on file   Tobacco Use    Smoking status: Not on file    Smokeless tobacco: Not on file   Substance and Sexual Activity    Alcohol use: Not on file    Drug use: Not on file    Sexual activity: Not on file   Other Topics Concern    Not on file   Social History Narrative    Not on file     Social Determinants of Health     Financial Resource Strain: Not on file   Food Insecurity: Not on file   Transportation Needs: Not on file   Physical Activity: Not on file   Stress: Not on file   Social Connections: Not on file   Intimate Partner Violence: Not on file   Housing Stability: Not on file           ROS:  [x] All negative/unchanged except if checked.  Explain positive(checked items) below:  [] Constitutional  [] Eyes  [] Ear/Nose/Mouth/Throat  [] Respiratory  [] CV  [] GI  []   [] Musculoskeletal  [] Skin/Breast  [] Neurological  [] Endocrine  [] Heme/Lymph  [] Allergic/Immunologic    Explanation:     MEDICATIONS:    Current Facility-Administered Medications:     PARoxetine (PAXIL) tablet 10 mg, 10 mg, Oral, Daily, Lisa Rahman MD, 10 mg at 03/06/23 0950    ondansetron (ZOFRAN-ODT) disintegrating tablet 4 mg, 4 mg, Oral, Q8H PRN **OR** ondansetron (ZOFRAN) injection 4 mg, 4 mg, IntraVENous, Q6H PRN, Chalino Aaron MD    polyethylene glycol (GLYCOLAX) packet 17 g, 17 g, Oral, Daily PRN, Chalino Aaron MD    acetaminophen (TYLENOL) tablet 650 mg, 650 mg, Oral, Q6H PRN **OR** acetaminophen (TYLENOL) suppository 650 mg, 650 mg, Rectal, Q6H PRN, Chalino Aaron MD    acetaminophen (TYLENOL) tablet 650 mg, 650 mg, Oral, Q4H PRN, Lisa Rahman MD    polyethylene glycol (GLYCOLAX) packet 17 g, 17 g, Oral, Daily PRN, Lisa Rahman MD    hydrOXYzine HCl (ATARAX) tablet 50 mg, 50 mg, Oral, TID PRN, Lisa Rahman MD    haloperidol (HALDOL) tablet 5 mg, 5 mg, Oral, Q4H PRN **OR** haloperidol lactate (HALDOL) injection 5 mg, 5 mg, IntraMUSCular, Q4H PRN, Lisa Rahman MD    traZODone (DESYREL) tablet 50 mg, 50 mg, Oral, Nightly PRN, Lisa Rahman MD      Examination:  /74   Pulse 78   Temp 98.1 °F (36.7 °C)   Resp 16   SpO2 97%   Gait - steady  Medication side effects(SE): denies     Mental Status Examination:    Level of consciousness:  within normal limits   Appearance:  fair grooming and fair hygiene  Behavior/Motor:  no abnormalities noted  Attitude toward examiner:  withdrawn  Speech:  spontaneous, normal rate, and normal volume   Mood: decreased range and depressed  Affect:  flat  Thought processes:  linear   Thought content:  Suicidal Ideation:  denies suicidal ideation  Delusions:  no evidence of delusions  Perceptual Disturbance:  denies any perceptual disturbance  Gambling compulsion Cognition:  oriented to person, place, and time   Concentration distractible  Insight good   Judgement poor     ASSESSMENT:   Patient symptoms are:  [] Well controlled  [x] Improving  [] Worsening  [] No change      Diagnosis:   Principal Problem:    Current severe episode of major depressive disorder without psychotic features, unspecified whether recurrent ([facility] [address]  Resolved Problems:    * No resolved hospital problems. *      LABS:    No results for input(s): WBC, HGB, PLT in the last 72 hours. Recent Labs     03/04/23  0506 03/05/23  0424 03/06/23  0632    144 139   K 3.4 3.9 3.9    108* 103   CO2 26 28 30   BUN 13 10 11   CREATININE 0.82 0.81 0.79   GLUCOSE 102* 87 98     No results for input(s): BILITOT, ALKPHOS, AST, ALT in the last 72 hours. Lab Results   Component Value Date/Time    LABAMPH Neg 03/02/2023 08:00 PM    BARBSCNU Neg 03/02/2023 08:00 PM    LABBENZ Neg 03/02/2023 08:00 PM    LABMETH Neg 03/02/2023 08:00 PM    OPIATESCREENURINE Neg 03/02/2023 08:00 PM    PHENCYCLIDINESCREENURINE Neg 03/02/2023 08:00 PM    ETOH <10 03/02/2023 08:13 PM     Lab Results   Component Value Date/Time    TSH 2.880 03/02/2023 08:13 PM     No results found for: LITHIUM  No results found for: VALPROATE, CBMZ    RISK ASSESSMENT: impulsivity high, suicide high     Treatment Plan:  Reviewed current Medications with the patient. Continue current med regime   Risks, benefits, side effects, drug-to-drug interactions and alternatives to treatment were discussed. Collateral information: see social work note  CD evaluation denies   Encourage patient to attend group and other milieu activities.   Discharge planning discussed with the patient and treatment team; dc later this week pending stability     PSYCHOTHERAPY/COUNSELING:  [x] Therapeutic interview  [x] Supportive  [] CBT  [] Ongoing  [] Other    [x] Patient continues to need, on a daily basis, active treatment furnished directly by or requiring the supervision of inpatient psychiatric personnel      Anticipated Length of stay:4 days        COSIGN : yes    Electronically signed by JUAN Smith CNP on 3/6/2023 at 1:12 PM       Addendum:  Mihir Perrin seen with NP after attending the team meeting, discussing the patient with the nursing and social work staff. I participated during the interview process as well as the treatment plan and spent more than 70% of the time with the nurse practitioner helping me in documentation.   I agree with the above documentation of clinical finding and treatment plan    Physician Signature: Electronically signed by Miguelito Leone MD on 3/6/2023 at 5:02 PM

## 2023-03-06 NOTE — PLAN OF CARE
Patient is out watching TV. Flat affect. Pt states he had a good day. Pt denies depression. States his anxiety is \"mild\". Denies SI/HI and AVH. Pt reports he showered today. Pt is attending groups and denies further needs at this time. Problem: Self Harm/Suicidality  Goal: Will have no self-injury during hospital stay  Description: INTERVENTIONS:  1. Ensure constant observer at bedside with Q15M safety checks  2. Maintain a safe environment  3. Secure patient belongings  4. Ensure family/visitors adhere to safety recommendations  5. Ensure safety tray has been added to patient's diet order  6. Every shift and PRN: Re-assess suicidal risk via Frequent Screener    3/5/2023 2234 by Kathy Barbosa RN  Outcome: Progressing  3/5/2023 1721 by Margoth Rogers RN  Outcome: Progressing  Flowsheets (Taken 3/5/2023 1515)  Will have no self-injury during hospital stay: Maintain a safe environment     Problem: Depression  Goal: Will be euthymic at discharge  Description: INTERVENTIONS:  1. Administer medication as ordered  2. Provide emotional support via 1:1 interaction with staff  3. Encourage involvement in milieu/groups/activities  4. Monitor for social isolation  3/5/2023 2234 by Kathy Barbosa RN  Outcome: Progressing  3/5/2023 1721 by Margoth Rogers RN  Outcome: Progressing     Problem: Behavior  Goal: Pt/Family maintain appropriate behavior and adhere to behavioral management agreement, if implemented  Description: INTERVENTIONS:  1. Assess patient/family's coping skills and  non-compliant behavior (including use of illegal substances)  2. Notify security of behavior or suspected illegal substances which indicate the need for search of the family and/or belongings  3. Encourage verbalization of thoughts and concerns in a socially appropriate manner  4. Utilize positive, consistent limit setting strategies supporting safety of patient, staff and others  5.  Encourage participation in the decision making process about the behavioral management agreement  6. If a visitor's behavior poses a threat to safety call refer to organization policy. 7. Initiate consult with , Psychosocial CNS, Spiritual Care as appropriate  3/5/2023 2234 by Kalani Ibarra RN  Outcome: Progressing  3/5/2023 1721 by Beverly Borden RN  Outcome: Progressing     Problem: Anxiety  Goal: Will report anxiety at manageable levels  Description: INTERVENTIONS:  1. Administer medication as ordered  2. Teach and rehearse alternative coping skills  3. Provide emotional support with 1:1 interaction with staff  3/5/2023 2234 by Kalani Ibarra RN  Outcome: Progressing  3/5/2023 1721 by Beverly Borden RN  Outcome: Progressing  Flowsheets (Taken 3/5/2023 1515)  Will report anxiety at manageable levels:   Provide emotional support with 1:1 interaction with staff   Administer medication as ordered   Teach and rehearse alternative coping skills     Problem: Sleep Disturbance  Goal: Will exhibit normal sleeping pattern  Description: INTERVENTIONS:  1. Administer medication as ordered  2. Decrease environmental stimuli, including noise, as appropriate  3.  Discourage social isolation and naps during the day  3/5/2023 2234 by Kalani Ibarra RN  Outcome: Progressing  3/5/2023 1721 by Beverly Borden RN  Outcome: Progressing

## 2023-03-06 NOTE — PROGRESS NOTES
Morning Community Meeting Topics    Markell Dixon attended the morning community meeting on 3/6/23. Topics discussed today     [x] Introduction  Day of the week and date  Mask distribution  Current mask requirements  [x]Teams  Explanation of  Green and Blue team criteria  Nurses assigned to each team for today  Explanation about green and blue paper  Date  Patient's Name  Patient's Nurse  Goals  [x] Visitation  Announce the visiting hours for the day  Announce which team is allowed to have visitors for the day  Review any updated Covid 19 requirements for visitors during visitation  Vaccine Card or negative Covid test within 48 hours of visit  State Identification  Patients are reminded to alert the  at least 1 hour before visitation   [x] Unit Orientation  Coffee use  Phone location and etiquette  Shower locations  [place] and dryer location and process  Common area expectations  Staff rounds expectation  [x] Meals   Educate patient to the menu  The patient is encouraged to fill out the menu to get preferences at mealtime  The patient is educated that if they do not fill out the menu, they will get the standard tray  The coffee pot is decaf, patient encouraged to order regular coffee from menu.   Educate patient to the meal process  Patient encouraged to eat snacks provided twice daily  Snacks may stay in patient room     [x] Discharge Process  Discharge expectations  Fill out the survey after discharge   [x] Hygiene  Daily showers encouraged  Showers availability discussed   Daily dressing encouraged  Discussed wearing street clothing  Education provided on where to place linens and clothing  Linens in the hamper  personal clothing does not go into the linen hamper  [x] Group   Patient encouraged to attend group provided  Time of Group Meetings discussed  Gentle reminder that attendance is a Physician order  [x] Movement  Chair exercises completed  Stretching completed  Notes:  GOAL : \" to keep a positive outlook\" Electronically signed by Elsi Moya on 3/6/2023 at 1:19 PM

## 2023-03-06 NOTE — GROUP NOTE
Group Therapy Note    Date: 3/6/2023    Group Start Time: 1000  Group End Time: 1100  Group Topic: Psychoeducation    [facility]    JUJU Jeffrey        Group Therapy Note    Attendees: 11       Patient's Goal:  \"to keep a positive outlook\"     Notes:  Pt. attended the 1000 skill group. Engaged in the group discussion with interest. Worked on project. Attentive.    Status After Intervention:  Improved    Participation Level: Active Listener and Interactive    Participation Quality: Appropriate, Attentive, and Sharing      Speech:  normal      Thought Process/Content: Logical      Affective Functioning: Congruent      Mood:  calm, happy      Level of consciousness:  Alert, Oriented x4, and Attentive      Response to Learning: Progressing to goal      Endings: None Reported    Modes of Intervention: Education, Support, Socialization, and Activity      Discipline Responsible: Psychoeducational Specialist      Signature:  JUJU Jeffrey

## 2023-03-06 NOTE — PLAN OF CARE
Pt visible on unit. Seen eating meals and socializing with select peers. Remains friendly and cooperative with staff and peers. Problem: Self Harm/Suicidality  Goal: Will have no self-injury during hospital stay  Description: INTERVENTIONS:  1. Ensure constant observer at bedside with Q15M safety checks  2. Maintain a safe environment  3. Secure patient belongings  4. Ensure family/visitors adhere to safety recommendations  5. Ensure safety tray has been added to patient's diet order  6. Every shift and PRN: Re-assess suicidal risk via Frequent Screener    Outcome: Progressing     Problem: Depression  Goal: Will be euthymic at discharge  Description: INTERVENTIONS:  1. Administer medication as ordered  2. Provide emotional support via 1:1 interaction with staff  3. Encourage involvement in milieu/groups/activities  4. Monitor for social isolation  Outcome: Progressing     Problem: Behavior  Goal: Pt/Family maintain appropriate behavior and adhere to behavioral management agreement, if implemented  Description: INTERVENTIONS:  1. Assess patient/family's coping skills and  non-compliant behavior (including use of illegal substances)  2. Notify security of behavior or suspected illegal substances which indicate the need for search of the family and/or belongings  3. Encourage verbalization of thoughts and concerns in a socially appropriate manner  4. Utilize positive, consistent limit setting strategies supporting safety of patient, staff and others  5. Encourage participation in the decision making process about the behavioral management agreement  6. If a visitor's behavior poses a threat to safety call refer to organization policy. 7. Initiate consult with , Psychosocial CNS, Spiritual Care as appropriate  Outcome: Progressing     Problem: Anxiety  Goal: Will report anxiety at manageable levels  Description: INTERVENTIONS:  1. Administer medication as ordered  2.  Teach and rehearse alternative coping skills  3. Provide emotional support with 1:1 interaction with staff  Outcome: Progressing     Problem: Sleep Disturbance  Goal: Will exhibit normal sleeping pattern  Description: INTERVENTIONS:  1. Administer medication as ordered  2. Decrease environmental stimuli, including noise, as appropriate  3.  Discourage social isolation and naps during the day  Outcome: Progressing

## 2023-03-06 NOTE — PROGRESS NOTES
Pt is noted up on the unit, explained and gave am paxil, pt reports being suicidal before admis that his debt ,financial worries became to much, but now he feels life is still worth living, that he would come to the [facility] or call his Confucianist ,friend or family member. Pt reports depression 4-5, anxiety 2-3, groups were encourage,pt is aware of his ck was elevated in the er. Reports getting the lov injection in the er before coming to the unit and might not need it here.

## 2023-03-06 NOTE — GROUP NOTE
Group Therapy Note    Date: 3/5/2023    Group Start Time:   Group End Time:   Group Topic: Healthy Living/Wellness    [facility] ADRIAN Hampton LPN        Group Therapy Note    Attendees:          Patient's Goal:  ***    Notes:  ***    Status After Intervention:  {Status After Intervention:395004747}    Participation Level: {Participation Level:026991570}    Participation Quality: {[facility] PARTICIPATION QUALITY:121217868}      Speech:  {[facility] CD_SPEECH:35556}      Thought Process/Content: {Thought Process/Content:010612404}      Affective Functioning: {Affective Functionin}      Mood: {Mood:755259644}      Level of consciousness:  {Level of consciousness:740921665}      Response to Learnin Doris Lorenzo [facility] Responses to Learnin}      Endings: {[facility] Endings:80196}    Modes of Intervention: {[facility] Modes of Intervention:236992206}      Discipline Responsible: {[facility] Multidisciplinary:910389474}      Signature:   Nella Hampton LPN

## 2023-03-06 NOTE — GROUP NOTE
Group Therapy Note    Date: 3/6/2023    Group Start Time: 1400  Group End Time: 1500  Group Topic: Healthy Living/Wellness    [facility]    Marylou Sims RN        Group Therapy Note    Attendees: 7/17       Patient's Goal:    Learning the benefits of practicing gratitude. Status After Intervention:  Unchanged    Participation Level:  Active Listener and Interactive    Participation Quality: Appropriate and Attentive      Speech:  normal      Thought Process/Content: Logical      Affective Functioning: Congruent      Mood: euthymic      Level of consciousness:  Oriented x4      Response to Learning: Able to verbalize current knowledge/experience      Endings: None Reported    Modes of Intervention: Education, Support, and Socialization      Discipline Responsible: Registered Nurse      Signature:  Marylou Sims RN

## 2023-03-06 NOTE — GROUP NOTE
Group Therapy Note    Date: 3/5/2023    Group Start Time: 2030  Group End Time: 2100  Group Topic: Healthy Living/Wellness    [address], LPN        Group Therapy Note    Attendees: 11/19       Patient's Goal:  To learn new coping skills. Notes:      Status After Intervention:  Unchanged    Participation Level: Interactive    Participation Quality: Attentive      Speech:  normal      Thought Process/Content: Logical      Affective Functioning: Congruent      Mood: euthymic      Level of consciousness:  Alert      Response to Learning: Able to verbalize/acknowledge new learning      Endings: None Reported    Modes of Intervention: Support      Discipline Responsible: Licensed Practical Nurse      Signature:   Alex Jameson LPN

## 2023-03-06 NOTE — GROUP NOTE
Group Therapy Note    Date: 3/6/2023    Group Start Time: 1600  Group End Time: 36  Group Topic: Healthy Living/Wellness    [facility]    Segun Farrar RN        Group Therapy Note    Attendees: 12/18       Patient's Goal:  Discussion on sleep hygiene     Notes:  Good discussion    Status After Intervention:  Unchanged    Participation Level:  Active Listener    Participation Quality: Appropriate      Speech:  normal      Thought Process/Content: Logical      Affective Functioning: Congruent      Mood: euthymic      Level of consciousness:  Alert      Response to Learning: Progressing to goal      Endings: None Reported    Modes of Intervention: Support      Discipline Responsible: Registered Nurse      Signature:  Segun Farrar RN

## 2023-03-07 PROCEDURE — 99232 SBSQ HOSP IP/OBS MODERATE 35: CPT | Performed by: PSYCHIATRY & NEUROLOGY

## 2023-03-07 PROCEDURE — 6370000000 HC RX 637 (ALT 250 FOR IP): Performed by: PSYCHIATRY & NEUROLOGY

## 2023-03-07 PROCEDURE — 90833 PSYTX W PT W E/M 30 MIN: CPT | Performed by: PSYCHIATRY & NEUROLOGY

## 2023-03-07 PROCEDURE — 1240000000 HC EMOTIONAL WELLNESS R&B

## 2023-03-07 RX ORDER — PAROXETINE HYDROCHLORIDE 20 MG/1
20 TABLET, FILM COATED ORAL DAILY
Status: DISCONTINUED | OUTPATIENT
Start: 2023-03-08 | End: 2023-03-11

## 2023-03-07 RX ADMIN — PAROXETINE 10 MG: 10 TABLET, FILM COATED ORAL at 08:40

## 2023-03-07 NOTE — GROUP NOTE
Group Therapy Note    Date: 3/7/2023    Group Start Time: 1400  Group End Time: 1500  Group Topic: Healthy Living/Wellness    [facility]    Radha Stallings RN        Group Therapy Note    Attendees:15/21       Patient's Goal: Learning how to acknowledge your positives, handout given. Status After Intervention:  Improved    Participation Level:  Active Listener    Participation Quality: Appropriate      Speech:  normal      Thought Process/Content: Logical      Affective Functioning: Congruent      Mood: euthymic      Level of consciousness:  Oriented x4      Response to Learning: Able to verbalize current knowledge/experience      Endings: None Reported    Modes of Intervention: Education, Support, and Socialization      Discipline Responsible: Registered Nurse      Signature:  Radha Stallings RN

## 2023-03-07 NOTE — GROUP NOTE
Group Therapy Note    Date: 3/7/2023    Group Start Time: 1000  Group End Time: 1055  Group Topic: Psychoeducation    [facility]    Humble Gardiner        Group Therapy Note    Attendees: 10/20       Patient's Goal:  \"Stay positive\"     Notes:  Patient attended the 1000 skills group. Patient was calm, interacted well with his peers and he worked independently on his task. Status After Intervention:  Improved    Participation Level:  Active Listener    Participation Quality: Appropriate      Speech:  normal      Thought Process/Content: Logical      Affective Functioning: Congruent      Mood:  calm      Level of consciousness:  Alert      Response to Learning: Progressing to goal      Endings: None Reported    Modes of Intervention: Education, Socialization, and Activity      Discipline Responsible: Psychoeducational Specialist      Signature:  Humble Gardiner

## 2023-03-07 NOTE — PLAN OF CARE
Problem: Self Harm/Suicidality  Goal: Will have no self-injury during hospital stay  Description: INTERVENTIONS:  1. Ensure constant observer at bedside with Q15M safety checks  2. Maintain a safe environment  3. Secure patient belongings  4. Ensure family/visitors adhere to safety recommendations  5. Ensure safety tray has been added to patient's diet order  6. Every shift and PRN: Re-assess suicidal risk via Frequent Screener    3/6/2023 2345 by Gino Carlin RN  Outcome: Progressing  Flowsheets (Taken 3/5/2023 1515 by Mary Murdock RN)  Will have no self-injury during hospital stay: Maintain a safe environment  3/6/2023 1348 by Amy Mccullough RN  Outcome: Progressing     Problem: Depression  Goal: Will be euthymic at discharge  Description: INTERVENTIONS:  1. Administer medication as ordered  2. Provide emotional support via 1:1 interaction with staff  3. Encourage involvement in milieu/groups/activities  4. Monitor for social isolation  3/6/2023 2345 by Gino Carlin RN  Outcome: Progressing  3/6/2023 1348 by Amy Mccullough RN  Outcome: Progressing     Problem: Behavior  Goal: Pt/Family maintain appropriate behavior and adhere to behavioral management agreement, if implemented  Description: INTERVENTIONS:  1. Assess patient/family's coping skills and  non-compliant behavior (including use of illegal substances)  2. Notify security of behavior or suspected illegal substances which indicate the need for search of the family and/or belongings  3. Encourage verbalization of thoughts and concerns in a socially appropriate manner  4. Utilize positive, consistent limit setting strategies supporting safety of patient, staff and others  5. Encourage participation in the decision making process about the behavioral management agreement  6. If a visitor's behavior poses a threat to safety call refer to organization policy.   7. Initiate consult with , Psychosocial CNS, Spiritual Care as appropriate  3/6/2023 2345 by Kevan Shoemaker RN  Outcome: Progressing  Flowsheets (Taken 3/6/2023 2345)  Patient/family maintains appropriate behavior and adheres to behavioral management agreement, if implemented:   Assess patient/familys coping skills and  non-compliant behavior (including use of illegal substances)   Utilize positive, consistent limit setting strategies supporting safety of patient, staff and others   Encourage verbalization of thoughts and concerns in a socially appropriate manner  3/6/2023 1348 by Mornea Montaño RN  Outcome: Progressing     Problem: Anxiety  Goal: Will report anxiety at manageable levels  Description: INTERVENTIONS:  1. Administer medication as ordered  2. Teach and rehearse alternative coping skills  3. Provide emotional support with 1:1 interaction with staff  3/6/2023 2345 by Kevan Shoemaker RN  Outcome: Progressing  Flowsheets (Taken 3/6/2023 2345)  Will report anxiety at manageable levels:   Administer medication as ordered   Provide emotional support with 1:1 interaction with staff  3/6/2023 1348 by Morena Montaño RN  Outcome: Progressing     Problem: Sleep Disturbance  Goal: Will exhibit normal sleeping pattern  Description: INTERVENTIONS:  1. Administer medication as ordered  2. Decrease environmental stimuli, including noise, as appropriate  3.  Discourage social isolation and naps during the day  3/6/2023 2345 by Kevan Shoemaker RN  Outcome: Progressing  3/6/2023 1348 by Morena Montaño RN  Outcome: Progressing

## 2023-03-07 NOTE — PROGRESS NOTES
Morning Community Meeting Topics    Ryan Cheng attended the morning community meeting on 3/7/23. Topics discussed today     [x] Introduction  Day of the week and date  Mask distribution  Current mask requirements  [x]Teams  Explanation of  Green and Blue team criteria  Nurses assigned to each team for today  Explanation about green and blue paper  Date  Patient's Name  Patient's Nurse  Goals  [x] Visitation  Announce the visiting hours for the day  Announce which team is allowed to have visitors for the day  Review any updated Covid 19 requirements for visitors during visitation  Vaccine Card or negative Covid test within 48 hours of visit  State Identification  Patients are reminded to alert the  at least 1 hour before visitation   [x] Unit Orientation  Coffee use  Phone location and etiquette  Shower locations  [place] and dryer location and process  Common area expectations  Staff rounds expectation  [x] Meals   Educate patient to the menu  The patient is encouraged to fill out the menu to get preferences at mealtime  The patient is educated that if they do not fill out the menu, they will get the standard tray  The coffee pot is decaf, patient encouraged to order regular coffee from menu.   Educate patient to the meal process  Patient encouraged to eat snacks provided twice daily  Snacks may stay in patient room     [x] Discharge Process  Discharge expectations  Fill out the survey after discharge   [x] Hygiene  Daily showers encouraged  Showers availability discussed   Daily dressing encouraged  Discussed wearing street clothing  Education provided on where to place linens and clothing  Linens in the hamper  personal clothing does not go into the linen hamper  [x] Group   Patient encouraged to attend group provided  Time of Group Meetings discussed  Gentle reminder that attendance is a Physician order  [x] Movement  Chair exercises completed  Stretching completed  Notes: Goal - \"Stay positive\" Electronically signed by Keena Gonzalez, [address] on 3/7/2023 at 9:35 AM

## 2023-03-07 NOTE — PLAN OF CARE
Patient reports desire to live. He regrets the financial bind that they are in and expresses concern for pressure he has put on his wife. Denies SI, HI, or hallucinations. Problem: Self Harm/Suicidality  Goal: Will have no self-injury during hospital stay  Description: INTERVENTIONS:  1. Ensure constant observer at bedside with Q15M safety checks  2. Maintain a safe environment  3. Secure patient belongings  4. Ensure family/visitors adhere to safety recommendations  5. Ensure safety tray has been added to patient's diet order  6. Every shift and PRN: Re-assess suicidal risk via Frequent Screener    3/7/2023 1151 by JUAN Cortez  Outcome: Progressing  Flowsheets (Taken 3/7/2023 1148)  Will have no self-injury during hospital stay: Every shift and PRN: Re-assess suicidal risk via Frequent Screener  3/6/2023 2345 by Janessa Stephenson RN  Outcome: Progressing  Flowsheets (Taken 3/5/2023 1515 by Darek Gillis RN)  Will have no self-injury during hospital stay: Maintain a safe environment     Problem: Depression  Goal: Will be euthymic at discharge  Description: INTERVENTIONS:  1. Administer medication as ordered  2. Provide emotional support via 1:1 interaction with staff  3. Encourage involvement in milieu/groups/activities  4. Monitor for social isolation  3/7/2023 1151 by JUAN Cortez  Outcome: Progressing  3/6/2023 2345 by Janessa Stephenson RN  Outcome: Progressing     Problem: Behavior  Goal: Pt/Family maintain appropriate behavior and adhere to behavioral management agreement, if implemented  Description: INTERVENTIONS:  1. Assess patient/family's coping skills and  non-compliant behavior (including use of illegal substances)  2. Notify security of behavior or suspected illegal substances which indicate the need for search of the family and/or belongings  3. Encourage verbalization of thoughts and concerns in a socially appropriate manner  4.  Utilize positive, consistent limit setting strategies supporting safety of patient, staff and others  5. Encourage participation in the decision making process about the behavioral management agreement  6. If a visitor's behavior poses a threat to safety call refer to organization policy. 7. Initiate consult with , Psychosocial CNS, Spiritual Care as appropriate  3/7/2023 1151 by JUAN Hodge  Outcome: Progressing  Flowsheets (Taken 3/7/2023 1148)  Patient/family maintains appropriate behavior and adheres to behavioral management agreement, if implemented: Assess patient/familys coping skills and  non-compliant behavior (including use of illegal substances)  3/6/2023 2345 by Harsh Alvarenga RN  Outcome: Progressing  Flowsheets (Taken 3/6/2023 2345)  Patient/family maintains appropriate behavior and adheres to behavioral management agreement, if implemented:   Assess patient/familys coping skills and  non-compliant behavior (including use of illegal substances)   Utilize positive, consistent limit setting strategies supporting safety of patient, staff and others   Encourage verbalization of thoughts and concerns in a socially appropriate manner     Problem: Anxiety  Goal: Will report anxiety at manageable levels  Description: INTERVENTIONS:  1. Administer medication as ordered  2. Teach and rehearse alternative coping skills  3.  Provide emotional support with 1:1 interaction with staff  3/7/2023 1151 by JUAN Hodge  Outcome: Progressing  Flowsheets (Taken 3/7/2023 1148)  Will report anxiety at manageable levels:   Administer medication as ordered   Provide emotional support with 1:1 interaction with staff  3/6/2023 2345 by Harsh Alvarenga RN  Outcome: Progressing  Flowsheets (Taken 3/6/2023 2345)  Will report anxiety at manageable levels:   Administer medication as ordered   Provide emotional support with 1:1 interaction with staff     Problem: Sleep Disturbance  Goal: Will exhibit normal sleeping pattern  Description: INTERVENTIONS:  1. Administer medication as ordered  2. Decrease environmental stimuli, including noise, as appropriate  3.  Discourage social isolation and naps during the day  3/7/2023 1151 by JUAN Bojorquez - CNS  Outcome: Progressing  3/6/2023 2345 by Christina Hicks RN  Outcome: Progressing

## 2023-03-07 NOTE — PROGRESS NOTES
Spiritual Support Group Note    Number of Participants in Group: 8                       Time: 1139-3111    Goal: Relief from isolation and loneliness             Kadie Sharing             Self-understanding and gain insight              Acceptance and belonging            Recognize they are not alone                Socialization             Empowerment       Encouragement    Topic:  [x] Spiritual Wellness and Self Care                  [] Hope                     [x] Connecting with Divine/Others        [] Thankfulness and Gratitude               []  Meaningfulness and Purpose               [] Forgiveness               [] Peace               [] Connect to [employer]      [] Other    Participation Level:   [x] Active Listener   [] Minimal   [] Monopolizing   [x] Interactive   [] No Participation   []  Other:     Attention:   [x] Alert   [] Distractible   [] Drowsy   [] Poor   [] Other:    Manner:   [x] Cooperative   [] Suspicious   [] Withdrawn   [] Guarded   [] Irritable   [] Inhospitable   [] Other:     Others Comments from Group:

## 2023-03-07 NOTE — PROGRESS NOTES
Pt is noted up on the unit this am, reports he is worried about his wife having to handle all financial responsibilities at home, pt reports anxiety 3-4 with racing thoughts, denied suicidal thoughts.

## 2023-03-07 NOTE — PROGRESS NOTES
[facility] \[facility]\"" 89. FOLLOW-UP NOTE       3/7/2023     Patient was seen and examined in person, Chart reviewed   Patient's case discussed with staff/team    Chief Complaint: SI Depression    Interim History:     Concerned about wife and financial situation; states wife has considered getting a job   Flat, blunted  Endorses anxiety, denies depression   Adequate sleep and appetite  Denies current SI HI AVH; no delusions  Denies substance abuse   Med compliant; denies side effects  Poor motivation; decreased energy   Remorseful    Appetite:   [x] Normal/Unchanged  [] Increased  [] Decreased      Sleep:       [x] Normal/Unchanged  [] Fair       [] Poor              Energy:    [] Normal/Unchanged  [] Increased  [x] Decreased        SI [] Present  [x] Absent    HI  []Present  [x] Absent     Aggression:  [] yes  [x] no    Patient is [x] able  [] unable to CONTRACT FOR SAFETY     PAST MEDICAL/PSYCHIATRIC HISTORY:   No past medical history on file. FAMILY/SOCIAL HISTORY:  No family history on file. Social History     Socioeconomic History    Marital status:      Spouse name: Not on file    Number of children: Not on file    Years of education: Not on file    Highest education level: Not on file   Occupational History    Not on file   Tobacco Use    Smoking status: Not on file    Smokeless tobacco: Not on file   Substance and Sexual Activity    Alcohol use: Not on file    Drug use: Not on file    Sexual activity: Not on file   Other Topics Concern    Not on file   Social History Narrative    Not on file     Social Determinants of Health     Financial Resource Strain: Not on file   Food Insecurity: Not on file   Transportation Needs: Not on file   Physical Activity: Not on file   Stress: Not on file   Social Connections: Not on file   Intimate Partner Violence: Not on file   Housing Stability: Not on file           ROS:  [x] All negative/unchanged except if checked.  Explain positive(checked items) below:  [] Constitutional  [] Eyes  [] Ear/Nose/Mouth/Throat  [] Respiratory  [] CV  [] GI  []   [] Musculoskeletal  [] Skin/Breast  [] Neurological  [] Endocrine  [] Heme/Lymph  [] Allergic/Immunologic    Explanation:     MEDICATIONS:    Current Facility-Administered Medications:     [START ON 3/8/2023] PARoxetine (PAXIL) tablet 20 mg, 20 mg, Oral, Daily, Tommy Harrison MD    ondansetron (ZOFRAN-ODT) disintegrating tablet 4 mg, 4 mg, Oral, Q8H PRN **OR** ondansetron (ZOFRAN) injection 4 mg, 4 mg, IntraVENous, Q6H PRN, Yessica Dos Santos MD    polyethylene glycol (GLYCOLAX) packet 17 g, 17 g, Oral, Daily PRN, Yessica Dos Santos MD    acetaminophen (TYLENOL) tablet 650 mg, 650 mg, Oral, Q6H PRN **OR** acetaminophen (TYLENOL) suppository 650 mg, 650 mg, Rectal, Q6H PRN, Yessica Dos Santos MD    acetaminophen (TYLENOL) tablet 650 mg, 650 mg, Oral, Q4H PRN, Cris Cummings MD    polyethylene glycol (GLYCOLAX) packet 17 g, 17 g, Oral, Daily PRN, Cris Cummings MD    hydrOXYzine HCl (ATARAX) tablet 50 mg, 50 mg, Oral, TID PRN, Cris Cummings MD    haloperidol (HALDOL) tablet 5 mg, 5 mg, Oral, Q4H PRN **OR** haloperidol lactate (HALDOL) injection 5 mg, 5 mg, IntraMUSCular, Q4H PRN, Cris Cummings MD    traZODone (DESYREL) tablet 50 mg, 50 mg, Oral, Nightly PRN, Cris Cummings MD      Examination:  /80   Pulse 80   Temp 97.9 °F (36.6 °C)   Resp 16   SpO2 96%   Gait - steady  Medication side effects(SE): denies     Mental Status Examination:    Level of consciousness:  within normal limits   Appearance:  fair grooming and fair hygiene  Behavior/Motor:  no abnormalities noted  Attitude toward examiner:  cooperative  Speech:  spontaneous, normal rate, and normal volume   Mood: depressed- brightening   Affect:  flat  Thought processes:  linear, goal oriented    Thought content:  Suicidal Ideation:  denies suicidal ideation  Delusions:  no evidence of delusions  Perceptual Disturbance:  denies any perceptual disturbance  Gambling compulsion   Cognition:  oriented to person, place, and time   Concentration distractible  Insight good   Judgement poor     ASSESSMENT:   Patient symptoms are:  [] Well controlled  [x] Improving  [] Worsening  [] No change      Diagnosis:   Principal Problem:    Current severe episode of major depressive disorder without psychotic features, unspecified whether [facility] ([facility]  Resolved Problems:    * No resolved hospital problems. *      LABS:    No results for input(s): WBC, HGB, PLT in the last 72 hours. Recent Labs     03/05/23  0424 03/06/23  0632    139   K 3.9 3.9   * 103   CO2 28 30   BUN 10 11   CREATININE 0.81 0.79   GLUCOSE 87 98     No results for input(s): BILITOT, ALKPHOS, AST, ALT in the last 72 hours. Lab Results   Component Value Date/Time    LABAMPH Neg 03/02/2023 08:00 PM    BARBSCNU Neg 03/02/2023 08:00 PM    LABBENZ Neg 03/02/2023 08:00 PM    LABMETH Neg 03/02/2023 08:00 PM    OPIATESCREENURINE Neg 03/02/2023 08:00 PM    PHENCYCLIDINESCREENURINE Neg 03/02/2023 08:00 PM    ETOH <10 03/02/2023 08:13 PM     Lab Results   Component Value Date/Time    TSH 2.880 03/02/2023 08:13 PM     No results found for: LITHIUM  No results found for: VALPROATE, CBMZ    RISK ASSESSMENT: impulsivity high, suicide high     Treatment Plan:  Reviewed current Medications with the patient. Increase Paxil 20mg QD  Risks, benefits, side effects, drug-to-drug interactions and alternatives to treatment were discussed. Collateral information: see social work note  CD evaluation denies   Encourage patient to attend group and other milieu activities.   Discharge planning discussed with the patient and treatment team; dc Thursday to home pending stability     PSYCHOTHERAPY/COUNSELING:  [x] Therapeutic interview  [x] Supportive  [] CBT  [] Ongoing  [] Other    [x] Patient continues to need, on a daily basis, active treatment furnished directly by or requiring the supervision of inpatient psychiatric personnel      Anticipated Length of stay:2 days    COSIGN : yes    Electronically signed by JUAN Hampton CNP on 3/7/2023 at 12:55 PM     Addendum:  Redmond Saint seen with NP after attending the team meeting, discussing the patient with the nursing and social work staff. I participated during the interview process as well as the treatment plan and spent more than 70% of the time with the nurse practitioner helping me in documentation.   I agree with the above documentation of clinical finding and treatment plan    Patient was seen 1:1 for 20 minutes, other than E&M time spent, focusing on      - coping skills techniques     - Anxiety management techniques discussed including deep breathing exercise and PMR     - discussing patients strength and weakness      - Motivational interviewing to assess the stage of change and assessing patient readiness to quit gambling .     - Focusing on negative cognition and maladaptive thoughts, which is feeding and maintaining the depression symptoms         Physician Signature: Electronically signed by Anita Farah MD

## 2023-03-08 PROCEDURE — 1240000000 HC EMOTIONAL WELLNESS R&B

## 2023-03-08 PROCEDURE — 99232 SBSQ HOSP IP/OBS MODERATE 35: CPT | Performed by: PSYCHIATRY & NEUROLOGY

## 2023-03-08 PROCEDURE — 90833 PSYTX W PT W E/M 30 MIN: CPT | Performed by: PSYCHIATRY & NEUROLOGY

## 2023-03-08 PROCEDURE — 6370000000 HC RX 637 (ALT 250 FOR IP): Performed by: PSYCHIATRY & NEUROLOGY

## 2023-03-08 RX ADMIN — PAROXETINE HYDROCHLORIDE 20 MG: 20 TABLET, FILM COATED ORAL at 08:43

## 2023-03-08 NOTE — PLAN OF CARE
Reports feeling better. Denies SI, HI, or hallucinations. Still worrisome about finances and how to get things back on track. Willing to get treatment fore Gambling and recognizes the need. Problem: Self Harm/Suicidality  Goal: Will have no self-injury during hospital stay  Description: INTERVENTIONS:  1. Ensure constant observer at bedside with Q15M safety checks  2. Maintain a safe environment  3. Secure patient belongings  4. Ensure family/visitors adhere to safety recommendations  5. Ensure safety tray has been added to patient's diet order  6. Every shift and PRN: Re-assess suicidal risk via Frequent Screener    3/8/2023 1015 by JUAN Romo  Outcome: Progressing  3/8/2023 0118 by Fernandez Medrano RN  Outcome: Progressing  Flowsheets (Taken 3/8/2023 0118)  Will have no self-injury during hospital stay: Every shift and PRN: Re-assess suicidal risk via Frequent Screener     Problem: Depression  Goal: Will be euthymic at discharge  Description: INTERVENTIONS:  1. Administer medication as ordered  2. Provide emotional support via 1:1 interaction with staff  3. Encourage involvement in milieu/groups/activities  4. Monitor for social isolation  3/8/2023 1015 by JUAN Romo  Outcome: Progressing  3/8/2023 0118 by Fernandez Medrano RN  Outcome: Progressing     Problem: Behavior  Goal: Pt/Family maintain appropriate behavior and adhere to behavioral management agreement, if implemented  Description: INTERVENTIONS:  1. Assess patient/family's coping skills and  non-compliant behavior (including use of illegal substances)  2. Notify security of behavior or suspected illegal substances which indicate the need for search of the family and/or belongings  3. Encourage verbalization of thoughts and concerns in a socially appropriate manner  4. Utilize positive, consistent limit setting strategies supporting safety of patient, staff and others  5.  Encourage participation in the decision making process about the behavioral management agreement  6. If a visitor's behavior poses a threat to safety call refer to organization policy. 7. Initiate consult with , Psychosocial CNS, Spiritual Care as appropriate  3/8/2023 1015 by JUAN Salter  Outcome: Progressing  Flowsheets (Taken 3/8/2023 1006)  Patient/family maintains appropriate behavior and adheres to behavioral management agreement, if implemented: Assess patient/familys coping skills and  non-compliant behavior (including use of illegal substances)  3/8/2023 0118 by Silver Black RN  Outcome: Progressing  Flowsheets (Taken 3/7/2023 1148 by JUAN Salter)  Patient/family maintains appropriate behavior and adheres to behavioral management agreement, if implemented: Assess patient/familys coping skills and  non-compliant behavior (including use of illegal substances)     Problem: Anxiety  Goal: Will report anxiety at manageable levels  Description: INTERVENTIONS:  1. Administer medication as ordered  2. Teach and rehearse alternative coping skills  3. Provide emotional support with 1:1 interaction with staff  3/8/2023 1015 by JUAN Salter  Outcome: Progressing  Flowsheets (Taken 3/8/2023 1006)  Will report anxiety at manageable levels:   Administer medication as ordered   Provide emotional support with 1:1 interaction with staff  3/8/2023 0118 by Silver Black RN  Outcome: Progressing  Flowsheets (Taken 3/8/2023 0118)  Will report anxiety at manageable levels:   Administer medication as ordered   Provide emotional support with 1:1 interaction with staff   Teach and rehearse alternative coping skills     Problem: Sleep Disturbance  Goal: Will exhibit normal sleeping pattern  Description: INTERVENTIONS:  1. Administer medication as ordered  2. Decrease environmental stimuli, including noise, as appropriate  3.  Discourage social isolation and naps during the day  3/8/2023 1015 by JUAN Salter CNS  Outcome: Progressing  3/8/2023 0118 by Crescencio Butts RN  Outcome: Progressing

## 2023-03-08 NOTE — GROUP NOTE
Group Therapy Note    Date: 3/7/2023    Group Start Time: 1600  Group End Time: 36  Group Topic: Healthy Living/Wellness    [facility]    Anna Keen RN        Group Therapy Note    Attendees: 24/24       Patient's Goal:  How to communicate feelings effectively     Notes:  Reviewed article    Status After Intervention:  Unchanged    Participation Level:  Active Listener    Participation Quality: Appropriate      Speech:  normal      Thought Process/Content: Logical      Affective Functioning: Congruent      Mood: euthymic      Level of consciousness:  Alert      Response to Learning: Progressing to goal      Endings: None Reported    Modes of Intervention: Support      Discipline Responsible: Registered Nurse      Signature:  Anna Keen RN

## 2023-03-08 NOTE — PROGRESS NOTES
[facility]. FOLLOW-UP NOTE       3/8/2023     Patient was seen and examined in person, Chart reviewed   Patient's case discussed with staff/team    Chief Complaint: SI Depression    Interim History:     Concerned about wife and financial situation; states wife has considered getting a job   Blunted incongruent affect  Remains remorseful  Reports desire for inpatient gambling program  Denies depression SI HI AVH; no delusions reported  Caring for self     Appetite:   [x] Normal/Unchanged  [] Increased  [] Decreased      Sleep:       [x] Normal/Unchanged  [] Fair       [] Poor              Energy:    [x] Normal/Unchanged  [] Increased  [] Decreased        SI [] Present  [x] Absent    HI  []Present  [x] Absent     Aggression:  [] yes  [x] no    Patient is [x] able  [] unable to CONTRACT FOR SAFETY     PAST MEDICAL/PSYCHIATRIC HISTORY:   No past medical history on file. FAMILY/SOCIAL HISTORY:  No family history on file. Social History     Socioeconomic History    Marital status:      Spouse name: Not on file    Number of children: Not on file    Years of education: Not on file    Highest education level: Not on file   Occupational History    Not on file   Tobacco Use    Smoking status: Not on file    Smokeless tobacco: Not on file   Substance and Sexual Activity    Alcohol use: Not on file    Drug use: Not on file    Sexual activity: Not on file   Other Topics Concern    Not on file   Social History Narrative    Not on file     Social Determinants of Health     Financial Resource Strain: Not on file   Food Insecurity: Not on file   Transportation Needs: Not on file   Physical Activity: Not on file   Stress: Not on file   Social Connections: Not on file   Intimate Partner Violence: Not on file   Housing Stability: Not on file           ROS:  [x] All negative/unchanged except if checked.  Explain positive(checked items) below:  [] Constitutional  [] Eyes  [] Ear/Nose/Mouth/Throat  [] Respiratory  [] CV  [] GI  []   [] Musculoskeletal  [] Skin/Breast  [] Neurological  [] Endocrine  [] Heme/Lymph  [] Allergic/Immunologic    Explanation:     MEDICATIONS:    Current Facility-Administered Medications:     PARoxetine (PAXIL) tablet 20 mg, 20 mg, Oral, Daily, Marija Brito MD, 20 mg at 03/08/23 0843    ondansetron (ZOFRAN-ODT) disintegrating tablet 4 mg, 4 mg, Oral, Q8H PRN **OR** ondansetron (ZOFRAN) injection 4 mg, 4 mg, IntraVENous, Q6H PRN, Kb Edmond MD    polyethylene glycol (GLYCOLAX) packet 17 g, 17 g, Oral, Daily PRN, Kb Edmond MD    acetaminophen (TYLENOL) tablet 650 mg, 650 mg, Oral, Q6H PRN **OR** acetaminophen (TYLENOL) suppository 650 mg, 650 mg, Rectal, Q6H PRN, Kb Edmond MD    acetaminophen (TYLENOL) tablet 650 mg, 650 mg, Oral, Q4H PRN, Karely Persaud MD    polyethylene glycol (GLYCOLAX) packet 17 g, 17 g, Oral, Daily PRN, Karely Persaud MD    hydrOXYzine HCl (ATARAX) tablet 50 mg, 50 mg, Oral, TID PRN, Karely Persaud MD    haloperidol (HALDOL) tablet 5 mg, 5 mg, Oral, Q4H PRN **OR** haloperidol lactate (HALDOL) injection 5 mg, 5 mg, IntraMUSCular, Q4H PRN, Karely Persaud MD    traZODone (DESYREL) tablet 50 mg, 50 mg, Oral, Nightly PRN, Karely Persaud MD      Examination:  /83   Pulse 75   Temp 98.1 °F (36.7 °C) (Oral)   Resp 20   SpO2 97%   Gait - steady  Medication side effects(SE): denies     Mental Status Examination:    Level of consciousness:  within normal limits   Appearance:  fair grooming and fair hygiene  Behavior/Motor:  no abnormalities noted  Attitude toward examiner:  cooperative  Speech:  spontaneous, normal rate, and normal volume   Mood: dysthymic  Affect:  flat, incongruent   Thought processes:  linear, goal oriented    Thought content:  Suicidal Ideation:  denies suicidal ideation  Delusions:  no evidence of delusions  Perceptual Disturbance:  denies any perceptual disturbance  Gambling compulsion Cognition:  oriented to person, place, and time   Concentration distractible  Insight good   Judgement fair    ASSESSMENT:   Patient symptoms are:  [] Well controlled  [x] Improving  [] Worsening  [] No change      Diagnosis:   Principal Problem:    Current severe episode of major depressive disorder without psychotic features, unspecified whether recurrent ([facility] [address]  Resolved Problems:    * No resolved hospital problems. *      LABS:    No results for input(s): WBC, HGB, PLT in the last 72 hours. Recent Labs     03/06/23  0632      K 3.9      CO2 30   BUN 11   CREATININE 0.79   GLUCOSE 98     No results for input(s): BILITOT, ALKPHOS, AST, ALT in the last 72 hours. Lab Results   Component Value Date/Time    LABAMPH Neg 03/02/2023 08:00 PM    BARBSCNU Neg 03/02/2023 08:00 PM    LABBENZ Neg 03/02/2023 08:00 PM    LABMETH Neg 03/02/2023 08:00 PM    OPIATESCREENURINE Neg 03/02/2023 08:00 PM    PHENCYCLIDINESCREENURINE Neg 03/02/2023 08:00 PM    ETOH <10 03/02/2023 08:13 PM     Lab Results   Component Value Date/Time    TSH 2.880 03/02/2023 08:13 PM     No results found for: LITHIUM  No results found for: VALPROATE, CBMZ    RISK ASSESSMENT: low    Treatment Plan:  Reviewed current Medications with the patient. Monitor efficacy and tolerability of current med regime   Resources given for gambling hotlines and inpatient facilities, however [place]. Discussed with social work team  Risks, benefits, side effects, drug-to-drug interactions and alternatives to treatment were discussed. Collateral information: see social work note  CD evaluation denies   Encourage patient to attend group and other milieu activities.   Discharge planning discussed with the patient and treatment team; DC Thursday to home pending stability     PSYCHOTHERAPY/COUNSELING:  [x] Therapeutic interview  [x] Supportive  [] CBT  [] Ongoing  [] Other    [x] Patient continues to need, on a daily basis, active treatment furnished directly by or requiring the supervision of inpatient psychiatric personnel      Anticipated Length of stay: 1 days    COSIGN : yes    Electronically signed by JUAN Mello CNP on 3/8/2023 at 2:34 PM       Physician Signature: Electronically signed by JUAN Mello CNP     Addendum:  Ptient seen with NP after attending the team meeting, discussing the patient with the nursing and social work staff. I participated during the interview process as well as the treatment plan and spent more than 70% of the time with the nurse practitioner helping me in documentation. I agree with the above documentation of clinical finding and treatment plan    Patient was seen 1:1 for 20 minutes, other than E&M time spent, focusing on      - coping skills techniques     - Anxiety management techniques discussed including deep breathing exercise and PMR     - discussing patients strength and weakness      - Motivational interviewing to assess the stage of change and assessing patient readiness to quit substance use.      - Focusing on negative cognition and maladaptive thoughts, which is feeding and maintaining the depression symptoms         Physician Signature: Electronically signed by Flex Dial MD on 3/9/2023 at 9:04 AM

## 2023-03-08 NOTE — PLAN OF CARE
Problem: Self Harm/Suicidality  Goal: Will have no self-injury during hospital stay  Description: INTERVENTIONS:  1. Ensure constant observer at bedside with Q15M safety checks  2. Maintain a safe environment  3. Secure patient belongings  4. Ensure family/visitors adhere to safety recommendations  5. Ensure safety tray has been added to patient's diet order  6. Every shift and PRN: Re-assess suicidal risk via Frequent Screener    3/8/2023 0118 by Janessa Stephenson RN  Outcome: Progressing  Flowsheets (Taken 3/8/2023 0118)  Will have no self-injury during hospital stay: Every shift and PRN: Re-assess suicidal risk via Frequent Screener  3/7/2023 1151 by JUAN Cortez  Outcome: Progressing  Flowsheets (Taken 3/7/2023 1148)  Will have no self-injury during hospital stay: Every shift and PRN: Re-assess suicidal risk via Frequent Screener     Problem: Depression  Goal: Will be euthymic at discharge  Description: INTERVENTIONS:  1. Administer medication as ordered  2. Provide emotional support via 1:1 interaction with staff  3. Encourage involvement in milieu/groups/activities  4. Monitor for social isolation  3/8/2023 0118 by Janessa Stephenson RN  Outcome: Progressing  3/7/2023 1151 by UJAN Cortez  Outcome: Progressing     Problem: Behavior  Goal: Pt/Family maintain appropriate behavior and adhere to behavioral management agreement, if implemented  Description: INTERVENTIONS:  1. Assess patient/family's coping skills and  non-compliant behavior (including use of illegal substances)  2. Notify security of behavior or suspected illegal substances which indicate the need for search of the family and/or belongings  3. Encourage verbalization of thoughts and concerns in a socially appropriate manner  4. Utilize positive, consistent limit setting strategies supporting safety of patient, staff and others  5.  Encourage participation in the decision making process about the behavioral management agreement  6. If a visitor's behavior poses a threat to safety call refer to organization policy. 7. Initiate consult with , Psychosocial CNS, Spiritual Care as appropriate  3/8/2023 0118 by Royal Ward RN  Outcome: Progressing  Flowsheets (Taken 3/7/2023 1148 by JUAN Montanez)  Patient/family maintains appropriate behavior and adheres to behavioral management agreement, if implemented: Assess patient/familys coping skills and  non-compliant behavior (including use of illegal substances)  3/7/2023 1151 by JUAN Montanez  Outcome: Progressing  Flowsheets (Taken 3/7/2023 1148)  Patient/family maintains appropriate behavior and adheres to behavioral management agreement, if implemented: Assess patient/familys coping skills and  non-compliant behavior (including use of illegal substances)     Problem: Anxiety  Goal: Will report anxiety at manageable levels  Description: INTERVENTIONS:  1. Administer medication as ordered  2. Teach and rehearse alternative coping skills  3. Provide emotional support with 1:1 interaction with staff  3/8/2023 0118 by Royal Ward RN  Outcome: Progressing  Flowsheets (Taken 3/8/2023 0118)  Will report anxiety at manageable levels:   Administer medication as ordered   Provide emotional support with 1:1 interaction with staff   Teach and rehearse alternative coping skills  3/7/2023 1151 by JUAN Montanez  Outcome: Progressing  Flowsheets (Taken 3/7/2023 1148)  Will report anxiety at manageable levels:   Administer medication as ordered   Provide emotional support with 1:1 interaction with staff     Problem: Sleep Disturbance  Goal: Will exhibit normal sleeping pattern  Description: INTERVENTIONS:  1. Administer medication as ordered  2. Decrease environmental stimuli, including noise, as appropriate  3.  Discourage social isolation and naps during the day  3/8/2023 0118 by Royal Ward RN  Outcome: Progressing  3/7/2023 1151 by Estephanie Emanuel JUAN Brewer - CNS  Outcome: Progressing

## 2023-03-08 NOTE — GROUP NOTE
Group Therapy Note    Date: 3/8/2023    Group Start Time: 3449  Group End Time: 1935  Group Topic: Medication    [facility]    Siddhartha Garland RN        Group Therapy Note    Attendees: 12/23         Status After Intervention:  Improved    Participation Level:  Active Listener    Participation Quality: Appropriate      Speech:  normal      Thought Process/Content: Logical      Affective Functioning: Congruent      Mood: euthymic      Level of consciousness:  Alert      Response to Learning: Able to verbalize current knowledge/experience      Endings: None Reported    Modes of Intervention: Education      Discipline Responsible: Registered Nurse      Signature:  Lali Thomas RN

## 2023-03-08 NOTE — GROUP NOTE
Group Therapy Note    Date: 3/7/2023    Group Start Time: 2015  Group End Time: 2030  Group Topic: Wrap-Up    [facility]    Ted Garnica RN        Group Therapy Note    Attendees: 7/24       Patient's Goal:  To be positive    Notes:  Progressing    Status After Intervention:  Unchanged    Participation Level:  Active Listener    Participation Quality: Appropriate      Speech:  normal      Thought Process/Content: Logical      Affective Functioning: Congruent      Mood: euthymic      Level of consciousness:  Alert      Response to Learning: Progressing to goal      Endings: None Reported    Modes of Intervention: Support      Discipline Responsible: Registered Nurse      Signature:  Ted Garnica RN

## 2023-03-08 NOTE — FLOWSHEET NOTE
Pt has been visible out on the unit seen eating meals and attending groups. pt's goal for afternoon group was [facility] to communicate feelings effectively\". Pt's states he is worried about his wife with the bills. Pt rates his anxiety a 3/10 and depression a 4/10. Pt denies SI,HI,AVH.

## 2023-03-08 NOTE — PROGRESS NOTES
Morning Community Meeting Topics    Rosario Nielsen attended the morning community meeting on 3/8/23. Topics discussed today     [x] Introduction  Day of the week and date  Mask distribution  Current mask requirements  [x]Teams  Explanation of  Green and Blue team criteria  Nurses assigned to each team for today  Explanation about green and blue paper  Date  Patient's Name  Patient's Nurse  Goals  [x] Visitation  Announce the visiting hours for the day  Announce which team is allowed to have visitors for the day  Review any updated Covid 19 requirements for visitors during visitation  Vaccine Card or negative Covid test within 48 hours of visit  State Identification  Patients are reminded to alert the  at least 1 hour before visitation   [x] Unit Orientation  Coffee use  Phone location and etiquette  Shower locations  [place] and dryer location and process  Common area expectations  Staff rounds expectation  [x] Meals   Educate patient to the menu  The patient is encouraged to fill out the menu to get preferences at mealtime  The patient is educated that if they do not fill out the menu, they will get the standard tray  The coffee pot is decaf, patient encouraged to order regular coffee from menu.   Educate patient to the meal process  Patient encouraged to eat snacks provided twice daily  Snacks may stay in patient room     [x] Discharge Process  Discharge expectations  Fill out the survey after discharge   [x] Hygiene  Daily showers encouraged  Showers availability discussed   Daily dressing encouraged  Discussed wearing street clothing  Education provided on where to place linens and clothing  Linens in the hamper  personal clothing does not go into the linen hamper  [x] Group   Patient encouraged to attend group provided  Time of Group Meetings discussed  Gentle reminder that attendance is a Physician order  [x] Movement  Chair exercises completed  Stretching completed  Notes: Goal - \"Keep a positive attitude\" Electronically signed by Cruz Mora, [address] on 3/8/2023 at 9:34 AM

## 2023-03-08 NOTE — PROGRESS NOTES
Discussed agencies that may be able to assist pt and family financially. Information fliers provided.

## 2023-03-08 NOTE — GROUP NOTE
Group Therapy Note    Date: 3/8/2023    Group Start Time: 1000  Group End Time: 1050  Group Topic: Psychoeducation    [facility] GABRIELE Corea        Group Therapy Note    Attendees: 11/25       Patient's Goal:  \"Keep a positive attitude\"     Notes:  Patient attended the 1000 skills group. Patient was calm, quiet, minimal interaction and he worked fairly well on his task. Status After Intervention:  Improved    Participation Level:  Active Listener    Participation Quality: Appropriate      Speech:  normal      Thought Process/Content: Linear      Affective Functioning: Congruent      Mood:  calm      Level of consciousness:  Alert      Response to Learning: Progressing to goal      Endings: None Reported    Modes of Intervention: Education, Socialization, and Activity      Discipline Responsible: Psychoeducational Specialist      Signature:  Hanh Corea

## 2023-03-09 PROCEDURE — 1240000000 HC EMOTIONAL WELLNESS R&B

## 2023-03-09 PROCEDURE — 90833 PSYTX W PT W E/M 30 MIN: CPT | Performed by: PSYCHIATRY & NEUROLOGY

## 2023-03-09 PROCEDURE — 6370000000 HC RX 637 (ALT 250 FOR IP): Performed by: REGISTERED NURSE

## 2023-03-09 PROCEDURE — 6370000000 HC RX 637 (ALT 250 FOR IP): Performed by: PSYCHIATRY & NEUROLOGY

## 2023-03-09 PROCEDURE — 99232 SBSQ HOSP IP/OBS MODERATE 35: CPT | Performed by: PSYCHIATRY & NEUROLOGY

## 2023-03-09 RX ORDER — NALTREXONE HYDROCHLORIDE 50 MG/1
50 TABLET, FILM COATED ORAL
Status: DISCONTINUED | OUTPATIENT
Start: 2023-03-09 | End: 2023-03-14 | Stop reason: HOSPADM

## 2023-03-09 RX ADMIN — NALTREXONE HYDROCHLORIDE 50 MG: 50 TABLET, FILM COATED ORAL at 10:17

## 2023-03-09 RX ADMIN — PAROXETINE HYDROCHLORIDE 20 MG: 20 TABLET, FILM COATED ORAL at 09:37

## 2023-03-09 NOTE — GROUP NOTE
Group Therapy Note    Date: 3/9/2023    Group Start Time: 1000  Group End Time: 1050  Group Topic: Psychoeducation    [facility]    Jass Ramirez        Group Therapy Note    Attendees: 11/23       Patient's Goal:  \"Keep focus on the positive\"     Notes:  Patient attended the 1000 skills group. Patient remains quiet and had minimal interactions. He worked well on his task. Status After Intervention:  Improved    Participation Level:  Active Listener    Participation Quality: Appropriate      Speech:  normal      Thought Process/Content: Logical      Affective Functioning: Congruent      Mood:  calm      Level of consciousness:  Alert, Oriented x4, and Attentive      Response to Learning: Able to retain information      Endings: None Reported    Modes of Intervention: Education, Socialization, and Activity      Discipline Responsible: Psychoeducational Specialist      Signature:  Jass Ramirez

## 2023-03-09 NOTE — PLAN OF CARE
Problem: Self Harm/Suicidality  Goal: Will have no self-injury during hospital stay  Description: INTERVENTIONS:  1. Ensure constant observer at bedside with Q15M safety checks  2. Maintain a safe environment  3. Secure patient belongings  4. Ensure family/visitors adhere to safety recommendations  5. Ensure safety tray has been added to patient's diet order  6. Every shift and PRN: Re-assess suicidal risk via Frequent Screener    3/8/2023 2242 by Roman Wilkerson RN  Outcome: Progressing  3/8/2023 1015 by JUAN Mclain  Outcome: Progressing     Problem: Depression  Goal: Will be euthymic at discharge  Description: INTERVENTIONS:  1. Administer medication as ordered  2. Provide emotional support via 1:1 interaction with staff  3. Encourage involvement in milieu/groups/activities  4. Monitor for social isolation  3/8/2023 2242 by Roman Wilkerson RN  Outcome: Progressing  3/8/2023 1015 by JUAN Mclain  Outcome: Progressing     Problem: Behavior  Goal: Pt/Family maintain appropriate behavior and adhere to behavioral management agreement, if implemented  Description: INTERVENTIONS:  1. Assess patient/family's coping skills and  non-compliant behavior (including use of illegal substances)  2. Notify security of behavior or suspected illegal substances which indicate the need for search of the family and/or belongings  3. Encourage verbalization of thoughts and concerns in a socially appropriate manner  4. Utilize positive, consistent limit setting strategies supporting safety of patient, staff and others  5. Encourage participation in the decision making process about the behavioral management agreement  6. If a visitor's behavior poses a threat to safety call refer to organization policy.   7. Initiate consult with , Psychosocial CNS, Spiritual Care as appropriate  3/8/2023 2242 by Roman Wilkerson RN  Outcome: Progressing  3/8/2023 1015 by JUAN Mclain - CNS  Outcome: Progressing  Flowsheets (Taken 3/8/2023 1006)  Patient/family maintains appropriate behavior and adheres to behavioral management agreement, if implemented: Assess patient/familys coping skills and  non-compliant behavior (including use of illegal substances)     Problem: Anxiety  Goal: Will report anxiety at manageable levels  Description: INTERVENTIONS:  1. Administer medication as ordered  2. Teach and rehearse alternative coping skills  3. Provide emotional support with 1:1 interaction with staff  3/8/2023 2242 by Ritesh Somers RN  Outcome: Progressing  3/8/2023 1015 by JUAN Jeronimo - CNS  Outcome: Progressing  Flowsheets (Taken 3/8/2023 1006)  Will report anxiety at manageable levels:   Administer medication as ordered   Provide emotional support with 1:1 interaction with staff     Problem: Sleep Disturbance  Goal: Will exhibit normal sleeping pattern  Description: INTERVENTIONS:  1. Administer medication as ordered  2. Decrease environmental stimuli, including noise, as appropriate  3. Discourage social isolation and naps during the day  3/8/2023 2242 by Ritesh Somers RN  Outcome: Progressing  3/8/2023 1015 by JUAN Jeronimo - CNS  Outcome: Progressing   Pt oou watching tv, social with select peers. Pt denies SI,HI,A/V hallucinations. Contracts for safety on the unit. Denies being depressed, reports anxiety 3-4/10 with 10 being the worst.  Reports worried about his wife at home, and utilities and phone being turned off. Hopeful for out-pt care at d/c. Affect sad/worrisome. Reports sleep and appetite good.

## 2023-03-09 NOTE — GROUP NOTE
Group Therapy Note    Date: 3/8/2023    Group Start Time: 1630  Group End Time: 9918  Group Topic: Wrap-Up    [facility]    Trudy Sesay RN        Group Therapy Note    Attendees: 9/21       Patient's Goal:  To stay positive    Notes:  Progressing    Status After Intervention:  Unchanged    Participation Level:  Active Listener    Participation Quality: Appropriate      Speech:  normal      Thought Process/Content: Logical      Affective Functioning: Congruent      Mood: euthymic      Level of consciousness:  Alert      Response to Learning: Progressing to goal      Endings: None Reported    Modes of Intervention: Support      Discipline Responsible: Registered Nurse      Signature:  Trudy Sesay RN

## 2023-03-09 NOTE — GROUP NOTE
Group Therapy Note    Date: 3/8/2023    Group Start Time: 1400  Group End Time: 1430  Group Topic: Healthy Living/Wellness    [facility] ADRIAN Chong RN        Group Therapy Note    Attendees: 9/21       Patient's Goal:  To attend group    Notes:  Good participation    Status After Intervention:  Unchanged    Participation Level:  Active Listener    Participation Quality: Appropriate      Speech:  normal      Thought Process/Content: Logical      Affective Functioning: Congruent      Mood: euthymic      Level of consciousness:  Alert      Response to Learning: Progressing to goal      Endings: None Reported    Modes of Intervention: Support      Discipline Responsible: Registered Nurse      Signature:  Gem Chong RN

## 2023-03-09 NOTE — PLAN OF CARE
Patient is alert and orient x 4, out on unit with worried affect and social at times. Patient denies SI/HI and hallucinations, he denies any depression, does admit to \"some anxiety. \"  Patient reports he did not sleep well last night because he was worried about his wife. Patient reports good appetite. Problem: Self Harm/Suicidality  Goal: Will have no self-injury during hospital stay  Description: INTERVENTIONS:  1. Ensure constant observer at bedside with Q15M safety checks  2. Maintain a safe environment  3. Secure patient belongings  4. Ensure family/visitors adhere to safety recommendations  5. Ensure safety tray has been added to patient's diet order  6. Every shift and PRN: Re-assess suicidal risk via Frequent Screener    Outcome: Progressing  Flowsheets (Taken 3/9/2023 1351)  Will have no self-injury during hospital stay: Every shift and PRN: Re-assess suicidal risk via Frequent Screener     Problem: Depression  Goal: Will be euthymic at discharge  Description: INTERVENTIONS:  1. Administer medication as ordered  2. Provide emotional support via 1:1 interaction with staff  3. Encourage involvement in milieu/groups/activities  4. Monitor for social isolation  Outcome: Progressing     Problem: Behavior  Goal: Pt/Family maintain appropriate behavior and adhere to behavioral management agreement, if implemented  Description: INTERVENTIONS:  1. Assess patient/family's coping skills and  non-compliant behavior (including use of illegal substances)  2. Notify security of behavior or suspected illegal substances which indicate the need for search of the family and/or belongings  3. Encourage verbalization of thoughts and concerns in a socially appropriate manner  4. Utilize positive, consistent limit setting strategies supporting safety of patient, staff and others  5. Encourage participation in the decision making process about the behavioral management agreement  6.  If a visitor's behavior poses a threat to safety call refer to organization policy.  7. Initiate consult with , Psychosocial CNS, Spiritual Care as appropriate  Outcome: Progressing  Flowsheets (Taken 3/9/2023 1351)  Patient/family maintains appropriate behavior and adheres to behavioral management agreement, if implemented:   Assess patient/family’s coping skills and  non-compliant behavior (including use of illegal substances)   Encourage verbalization of thoughts and concerns in a socially appropriate manner   Utilize positive, consistent limit setting strategies supporting safety of patient, staff and others   Encourage participation in the decision making process about the behavioral management agreement     Problem: Anxiety  Goal: Will report anxiety at manageable levels  Description: INTERVENTIONS:  1. Administer medication as ordered  2. Teach and rehearse alternative coping skills  3. Provide emotional support with 1:1 interaction with staff  Outcome: Progressing  Flowsheets (Taken 3/9/2023 1351)  Will report anxiety at manageable levels:   Administer medication as ordered   Teach and rehearse alternative coping skills   Provide emotional support with 1:1 interaction with staff     Problem: Sleep Disturbance  Goal: Will exhibit normal sleeping pattern  Description: INTERVENTIONS:  1. Administer medication as ordered  2. Decrease environmental stimuli, including noise, as appropriate  3. Discourage social isolation and naps during the day  Outcome: Progressing

## 2023-03-09 NOTE — PROGRESS NOTES
Pt. declined to attend the 0900 community meeting, despite staff encouragement.  Goal - \"Keep focus on the positive\" Electronically signed by ISIS Anderson on 3/9/2023 at 9:53 AM

## 2023-03-09 NOTE — PROGRESS NOTES
[facility]. FOLLOW-UP NOTE       3/9/2023     Patient was seen and examined in person, Chart reviewed   Patient's case discussed with staff/team    Chief Complaint: SI Depression    Interim History:     Anxious about discharge  Several risks factor exist for suicide; financial conflict, marital discord, remorse, white middle aged male, gambling addiction  Pt expresses desire for additional day to make inpatient gambling program inquiries; facility list given yesterday  Reports adequate sleep and appetite  Denies SI HI AVH; no delusions reported  Endorse anxiety, minimal depression, states more remorseful due to the burden he has put on his family   Pt agreeable to craving medication     Appetite:   [x] Normal/Unchanged  [] Increased  [] Decreased      Sleep:       [x] Normal/Unchanged  [] Fair       [] Poor              Energy:    [x] Normal/Unchanged  [] Increased  [] Decreased        SI [] Present  [x] Absent    HI  []Present  [x] Absent     Aggression:  [] yes  [x] no    Patient is [x] able  [] unable to CONTRACT FOR SAFETY     PAST MEDICAL/PSYCHIATRIC HISTORY:   No past medical history on file. FAMILY/SOCIAL HISTORY:  No family history on file.   Social History     Socioeconomic History    Marital status:      Spouse name: Not on file    Number of children: Not on file    Years of education: Not on file    Highest education level: Not on file   Occupational History    Not on file   Tobacco Use    Smoking status: Not on file    Smokeless tobacco: Not on file   Substance and Sexual Activity    Alcohol use: Not on file    Drug use: Not on file    Sexual activity: Not on file   Other Topics Concern    Not on file   Social History Narrative    Not on file     Social Determinants of Health     Financial Resource Strain: Not on file   Food Insecurity: Not on file   Transportation Needs: Not on file   Physical Activity: Not on file   Stress: Not on file   Social Connections: Not on file   Intimate Partner Violence: Not on file   Housing Stability: Not on file           ROS:  [x] All negative/unchanged except if checked.  Explain positive(checked items) below:  [] Constitutional  [] Eyes  [] Ear/Nose/Mouth/Throat  [] Respiratory  [] CV  [] GI  []   [] Musculoskeletal  [] Skin/Breast  [] Neurological  [] Endocrine  [] Heme/Lymph  [] Allergic/Immunologic    Explanation:     MEDICATIONS:    Current Facility-Administered Medications:     naltrexone (DEPADE) tablet 50 mg, 50 mg, Oral, Daily with breakfast, Yasmeen Mora APRN - CNP    PARoxetine (PAXIL) tablet 20 mg, 20 mg, Oral, Daily, Diana Cooper MD, 20 mg at 03/09/23 0937    ondansetron (ZOFRAN-ODT) disintegrating tablet 4 mg, 4 mg, Oral, Q8H PRN **OR** [DISCONTINUED] ondansetron (ZOFRAN) injection 4 mg, 4 mg, IntraVENous, Q6H PRN, Jerod Merchant MD    polyethylene glycol (GLYCOLAX) packet 17 g, 17 g, Oral, Daily PRN, Jerod Merchant MD    acetaminophen (TYLENOL) tablet 650 mg, 650 mg, Oral, Q4H PRN, Hilary Lizarraga MD    hydrOXYzine HCl (ATARAX) tablet 50 mg, 50 mg, Oral, TID PRN, Hilary Lizarraga MD    haloperidol (HALDOL) tablet 5 mg, 5 mg, Oral, Q4H PRN **OR** haloperidol lactate (HALDOL) injection 5 mg, 5 mg, IntraMUSCular, Q4H PRN, Hilary Lizarraga MD    traZODone (DESYREL) tablet 50 mg, 50 mg, Oral, Nightly PRN, Hilary Lizarraga MD      Examination:  /83   Pulse 76   Temp 97.7 °F (36.5 °C) (Oral)   Resp (!) 76   SpO2 96%   Gait - steady  Medication side effects(SE): denies     Mental Status Examination:    Level of consciousness:  within normal limits   Appearance:  fair grooming and fair hygiene  Behavior/Motor:  no abnormalities noted  Attitude toward examiner:  cooperative and good eye contact  Speech:  spontaneous, normal rate, and normal volume   Mood: anxious  Affect:  anxious  Thought processes:  linear and goal directed   Thought content:  Suicidal Ideation:  denies suicidal ideation  Delusions:  no evidence of delusions  Perceptual Disturbance:  denies any perceptual disturbance  Cognition:  oriented to person, place, and time   Concentration intact  Insight fair   Judgement fair     ASSESSMENT:   Patient symptoms are:  [x] Well controlled  [x] Improving  [] Worsening  [] No change      Diagnosis:   Principal Problem:    Current severe episode of major depressive disorder without psychotic features, unspecified whether recurrent ([facility]  Resolved Problems:    * No resolved hospital problems. *      LABS:    No results for input(s): WBC, HGB, PLT in the last 72 hours. No results for input(s): NA, K, CL, CO2, BUN, CREATININE, GLUCOSE in the last 72 hours. No results for input(s): BILITOT, ALKPHOS, AST, ALT in the last 72 hours. Lab Results   Component Value Date/Time    LABAMPH Neg 03/02/2023 08:00 PM    BARBSCNU Neg 03/02/2023 08:00 PM    LABBENZ Neg 03/02/2023 08:00 PM    LABMETH Neg 03/02/2023 08:00 PM    OPIATESCREENURINE Neg 03/02/2023 08:00 PM    PHENCYCLIDINESCREENURINE Neg 03/02/2023 08:00 PM    ETOH <10 03/02/2023 08:13 PM     Lab Results   Component Value Date/Time    TSH 2.880 03/02/2023 08:13 PM     No results found for: LITHIUM  No results found for: VALPROATE, CBMZ    RISK ASSESSMENT: suicide high considering risk factors     Treatment Plan:  Reviewed current Medications with the patient. Initate Naltrexone 50mg QD for cravings   Risks, benefits, side effects, drug-to-drug interactions and alternatives to treatment were discussed. Collateral information: see social work   CD evaluation denies   Encourage patient to attend group and other milieu activities.   Discharge planning discussed with the patient and treatment team; DC tomorrow to home with outpatient services; pt calling on out of state inpatient gambling facilities     PSYCHOTHERAPY/COUNSELING:  [x] Therapeutic interview  [x] Supportive  [] CBT  [] Ongoing  [] Other    [x] Patient continues to need, on a daily basis, active treatment furnished directly by or requiring the supervision of inpatient psychiatric personnel      Anticipated Length of stay:1 day       COSIGN : yes    Electronically signed by JUAN Sheffield CNP on 3/9/2023 at 9:42 AM       Addendum:  Jessica Sen seen with NP after attending the team meeting, discussing the patient with the nursing and social work staff. I participated during the interview process as well as the treatment plan and spent more than 70% of the time with the nurse practitioner helping me in documentation. I agree with the above documentation of clinical finding and treatment plan    Patient was seen 1:1 for 20 minutes, other than E&M time spent, focusing on      - coping skills techniques     - Anxiety management techniques discussed including deep breathing exercise and PMR     - discussing patients strength and weakness      - Motivational interviewing to assess the stage of change and assessing patient readiness to quit substance use.       Physician Signature: Electronically signed by Merly Guillermo MD on 3/9/2023 at 3:27 PM      Physician Signature: Electronically signed by Merly Guillermo MD on 3/9/2023 at 3:27 PM

## 2023-03-09 NOTE — GROUP NOTE
Group Therapy Note    Date: 3/9/2023    Group Start Time: 1287  Group End Time: 1488  Group Topic: Healthy Living/Wellness    [facility]    Leon Tinoco RN        Group Therapy Note    Attendees: 9/23       Patient's Goal:  Discussed boundaries    Notes:  Good participation    Status After Intervention:  Unchanged    Participation Level:  Active Listener    Participation Quality: Appropriate      Speech:  normal      Thought Process/Content: Logical      Affective Functioning: Congruent      Mood: euthymic      Level of consciousness:  Alert      Response to Learning: Progressing to goal      Endings: None Reported    Modes of Intervention: Support      Discipline Responsible: Registered Nurse      Signature:  Leon Tinoco RN

## 2023-03-09 NOTE — GROUP NOTE
Group Therapy Note    Date: 3/9/2023    Group Start Time: 1645  Group End Time: 1700  Group Topic: Wrap-Up    [facility]    Caryl Barthel, RN        Group Therapy Note    Attendees: 8/23       Patient's Goal:  To be positive    Notes:  Progressing    Status After Intervention:  Unchanged    Participation Level:  Active Listener    Participation Quality: Appropriate      Speech:  normal      Thought Process/Content: Logical      Affective Functioning: Congruent      Mood: euthymic      Level of consciousness:  Alert      Response to Learning: Progressing to goal      Endings: None Reported    Modes of Intervention: Support      Discipline Responsible: Registered Nurse      Signature:  Caryl Barthel, RN

## 2023-03-10 PROCEDURE — 1240000000 HC EMOTIONAL WELLNESS R&B

## 2023-03-10 PROCEDURE — 6370000000 HC RX 637 (ALT 250 FOR IP): Performed by: PSYCHIATRY & NEUROLOGY

## 2023-03-10 PROCEDURE — 99239 HOSP IP/OBS DSCHRG MGMT >30: CPT | Performed by: PSYCHIATRY & NEUROLOGY

## 2023-03-10 PROCEDURE — 6370000000 HC RX 637 (ALT 250 FOR IP): Performed by: REGISTERED NURSE

## 2023-03-10 RX ORDER — HYDROXYZINE 50 MG/1
50 TABLET, FILM COATED ORAL 3 TIMES DAILY PRN
Qty: 30 TABLET | Refills: 1 | Status: SHIPPED | OUTPATIENT
Start: 2023-03-10 | End: 2023-03-14 | Stop reason: SDUPTHER

## 2023-03-10 RX ORDER — NALTREXONE HYDROCHLORIDE 50 MG/1
50 TABLET, FILM COATED ORAL
Qty: 15 TABLET | Refills: 2 | Status: SHIPPED | OUTPATIENT
Start: 2023-03-11 | End: 2023-03-14 | Stop reason: SDUPTHER

## 2023-03-10 RX ORDER — PAROXETINE HYDROCHLORIDE 20 MG/1
20 TABLET, FILM COATED ORAL DAILY
Qty: 15 TABLET | Refills: 3 | Status: SHIPPED | OUTPATIENT
Start: 2023-03-10 | End: 2023-03-13 | Stop reason: HOSPADM

## 2023-03-10 RX ADMIN — NALTREXONE HYDROCHLORIDE 50 MG: 50 TABLET, FILM COATED ORAL at 08:49

## 2023-03-10 RX ADMIN — PAROXETINE HYDROCHLORIDE 20 MG: 20 TABLET, FILM COATED ORAL at 08:49

## 2023-03-10 RX ADMIN — HYDROXYZINE HYDROCHLORIDE 50 MG: 25 TABLET, FILM COATED ORAL at 18:23
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
## 2023-03-10 NOTE — GROUP NOTE
Group Therapy Note    Date: 3/10/2023    Group Start Time: 1000  Group End Time: 1100  Group Topic: Psychoeducation    [facility] 3W BHADRIAN Baptiste, CTRS        Group Therapy Note    Attendees: 13       Patient's Goal:  \"to be positive\"    Notes:  Pt. attended the 1000 skill group. Happy to be going home. Talkative with other pts and easily engaged in the group discussion. Status After Intervention:  Improved    Participation Level:  Active Listener and Interactive    Participation Quality: Appropriate, Attentive, and Sharing      Speech:  normal      Thought Process/Content: Logical      Affective Functioning: Congruent      Mood:  calm      Level of consciousness:  Alert, Oriented x4, and Attentive      Response to Learning: Progressing to goal      Endings: None Reported    Modes of Intervention: Education, Support, Socialization, and Activity      Discipline Responsible: Psychoeducational Specialist      Signature:  Kodi Vazquez

## 2023-03-10 NOTE — PROGRESS NOTES
Morning Community Meeting Topics    Tesfaye Willis attended the morning community meeting on 3/10/23. Topics discussed today     [x] Introduction  Day of the week and date  Mask distribution  Current mask requirements  [x]Teams  Explanation of  Green and Blue team criteria  Nurses assigned to each team for today  Explanation about green and blue paper  Date  Patient's Name  Patient's Nurse  Goals  [x] Visitation  Announce the visiting hours for the day  Announce which team is allowed to have visitors for the day  Review any updated Covid 19 requirements for visitors during visitation  Vaccine Card or negative Covid test within 48 hours of visit  State Identification  Patients are reminded to alert the  at least 1 hour before visitation   [x] Unit Orientation  Coffee use  Phone location and etiquette  Shower locations  [place] and dryer location and process  Common area expectations  Staff rounds expectation  [x] Meals   Educate patient to the menu  The patient is encouraged to fill out the menu to get preferences at mealtime  The patient is educated that if they do not fill out the menu, they will get the standard tray  The coffee pot is decaf, patient encouraged to order regular coffee from menu.   Educate patient to the meal process  Patient encouraged to eat snacks provided twice daily  Snacks may stay in patient room     [x] Discharge Process  Discharge expectations  Fill out the survey after discharge   [x] Hygiene  Daily showers encouraged  Showers availability discussed   Daily dressing encouraged  Discussed wearing street clothing  Education provided on where to place linens and clothing  Linens in the hamper  personal clothing does not go into the linen hamper  [x] Group   Patient encouraged to attend group provided  Time of Group Meetings discussed  Gentle reminder that attendance is a Physician order  [x] Movement  Chair exercises completed  Stretching completed  Notes:  GOAL : \" to be positive\" Electronically signed by JUJU Jeffrey on 3/10/2023 at 12:47 PM

## 2023-03-10 NOTE — PLAN OF CARE
Problem: Self Harm/Suicidality  Goal: Will have no self-injury during hospital stay  Description: INTERVENTIONS:  1. Ensure constant observer at bedside with Q15M safety checks  2. Maintain a safe environment  3. Secure patient belongings  4. Ensure family/visitors adhere to safety recommendations  5. Ensure safety tray has been added to patient's diet order  6. Every shift and PRN: Re-assess suicidal risk via Frequent Screener    3/9/2023 2034 by Enzo Villalpando RN  Outcome: Progressing  3/9/2023 1402 by Debra Tanner RN  Outcome: Progressing  Flowsheets (Taken 3/9/2023 1351)  Will have no self-injury during hospital stay: Every shift and PRN: Re-assess suicidal risk via Frequent Screener     Problem: Depression  Goal: Will be euthymic at discharge  Description: INTERVENTIONS:  1. Administer medication as ordered  2. Provide emotional support via 1:1 interaction with staff  3. Encourage involvement in milieu/groups/activities  4. Monitor for social isolation  3/9/2023 2034 by Enzo Villalpando RN  Outcome: Progressing  3/9/2023 1402 by Debra Tanner RN  Outcome: Progressing     Problem: Behavior  Goal: Pt/Family maintain appropriate behavior and adhere to behavioral management agreement, if implemented  Description: INTERVENTIONS:  1. Assess patient/family's coping skills and  non-compliant behavior (including use of illegal substances)  2. Notify security of behavior or suspected illegal substances which indicate the need for search of the family and/or belongings  3. Encourage verbalization of thoughts and concerns in a socially appropriate manner  4. Utilize positive, consistent limit setting strategies supporting safety of patient, staff and others  5. Encourage participation in the decision making process about the behavioral management agreement  6. If a visitor's behavior poses a threat to safety call refer to organization policy.   7. Initiate consult with , Psychosocial CNS, Spiritual Care as appropriate  3/9/2023 2034 by Adama Pedraza RN  Outcome: Progressing  3/9/2023 1402 by Kim Edouard RN  Outcome: Progressing  Flowsheets (Taken 3/9/2023 1351)  Patient/family maintains appropriate behavior and adheres to behavioral management agreement, if implemented:   Assess patient/familys coping skills and  non-compliant behavior (including use of illegal substances)   Encourage verbalization of thoughts and concerns in a socially appropriate manner   Utilize positive, consistent limit setting strategies supporting safety of patient, staff and others   Encourage participation in the decision making process about the behavioral management agreement     Problem: Anxiety  Goal: Will report anxiety at manageable levels  Description: INTERVENTIONS:  1. Administer medication as ordered  2. Teach and rehearse alternative coping skills  3. Provide emotional support with 1:1 interaction with staff  3/9/2023 2034 by Adama Pedraza RN  Outcome: Progressing  3/9/2023 1402 by Kim Edouard RN  Outcome: Progressing  Flowsheets (Taken 3/9/2023 1351)  Will report anxiety at manageable levels:   Administer medication as ordered   Teach and rehearse alternative coping skills   Provide emotional support with 1:1 interaction with staff     Problem: Sleep Disturbance  Goal: Will exhibit normal sleeping pattern  Description: INTERVENTIONS:  1. Administer medication as ordered  2. Decrease environmental stimuli, including noise, as appropriate  3. Discourage social isolation and naps during the day  3/9/2023 2034 by Adama Pedraza RN  Outcome: Progressing  3/9/2023 1402 by Kim Edouard RN  Outcome: Progressing   Pt OOU, watching TV ,minimally social. Pt denies current SI,HI,A/V hallucinations. Contracts for safety on the unit. Denies depression,rates anxiety 4-5/10 with 10 being the worst. Reminded pt to alert staff if he cannot sleep. Pt verbalized understanding. Pt hoping to find an in-pt tx center for his mental health and gambling addiction. Pt continues to worry about wife and finances, thou she received his profit sharing check today,and was able to pay some of the bills. Reports he and wife agree he should not come home yet and needs in-pt tx for gambling and mental health. Affect worrisome.

## 2023-03-10 NOTE — DISCHARGE INSTRUCTIONS
Keep all follow up appointments, take medications as ordered, utilize positive supports, abstain from use of alcohol and drugs. If symptoms return or you feel at risk to yourself or others, please call 911, return the nearest emergency room, or call your local crisis hotline:  [facility]: 1(013) [address]: 1(689) [address]: 1(428) [phone]     Due to the [address] [facility] is not currently available. For assistance with quitting smoking please go to https://smokefree.gov. A prescription for an FDA-approved tobacco cessation medication was offered at discharge and the patient refused. Someone from [address] [place] [address]. will be calling you tomorrow to follow up on your care. If you don't hear from us, give us a call! [phone].      Pt has already had a flu vaccine this season

## 2023-03-10 NOTE — DISCHARGE INSTR - DIET

## 2023-03-10 NOTE — PROGRESS NOTES
Pt seen pacing on the unit, talking on different phones attempting to make living arrangements after discharge today as pt stated his wife does not want him to return home. Pt stated he  needs to be cabbed to location [address] [facility] to  belongings and then cabbed to [address] [facility]. This RN advised pt that he would be cabbed to 1 location only. Pt stated he felt lightheaded and requested I take his weight which was 191 lbs. Pt stated he attempted to contact several people throughout the day but no one will answer the phone. Pt stated the person he is going to stay with gets off work at [address] [place] [address] and won't answer the phone. Pt stated this situation is \"making me have thoughts of not being here\". When pt asked to clarify what he meant, Pt shook his head and was tearful. Pt stated \"I don't think I can stay safe because of this situation. I made a lot of bad decisions\". This RN called Dr Temo Richards who advised me to contact Dr. Alivia Kearney. This Pt left message for Dr. Alivia Kearney.

## 2023-03-11 PROCEDURE — 6370000000 HC RX 637 (ALT 250 FOR IP): Performed by: REGISTERED NURSE

## 2023-03-11 PROCEDURE — 6370000000 HC RX 637 (ALT 250 FOR IP): Performed by: PSYCHIATRY & NEUROLOGY

## 2023-03-11 PROCEDURE — 1240000000 HC EMOTIONAL WELLNESS R&B

## 2023-03-11 RX ORDER — PAROXETINE 30 MG/1
30 TABLET, FILM COATED ORAL DAILY
Status: DISCONTINUED | OUTPATIENT
Start: 2023-03-12 | End: 2023-03-14 | Stop reason: HOSPADM

## 2023-03-11 RX ADMIN — PAROXETINE HYDROCHLORIDE 20 MG: 20 TABLET, FILM COATED ORAL at 09:16

## 2023-03-11 RX ADMIN — NALTREXONE HYDROCHLORIDE 50 MG: 50 TABLET, FILM COATED ORAL at 09:16

## 2023-03-11 NOTE — GROUP NOTE
Group Therapy Note    Date: 3/11/2023    Group Start Time: 1000  Group End Time: 1100  Group Topic: Psychoeducation    [facility] 3W GABRIELE Ochoa, CTRS        Group Therapy Note    Attendees: 12       Patient's Goal:  \"to be more positive\"    Notes:  Pt. attended the 1000 skill group. Pt. worked on project with interest. Feeling better, but was quiet. Status After Intervention:  Unchanged    Participation Level:  Active Listener and Minimal    Participation Quality: Appropriate and Attentive      Speech:  normal      Thought Process/Content: Logical      Affective Functioning: Congruent      Mood:  calm      Level of consciousness:  Alert, Oriented x4, and Attentive      Response to Learning: Progressing to goal      Endings: None Reported    Modes of Intervention: Education, Support, Socialization, and Activity      Discipline Responsible: Psychoeducational Specialist      Signature:  Jan To

## 2023-03-11 NOTE — PROGRESS NOTES
Pt. declined to attend the 0900 community meeting, despite staff encouragement.  Goal - \"To be more positive\" Electronically signed by Eugenio Joyner, [address] on 3/11/2023 at 1:03 PM

## 2023-03-11 NOTE — PLAN OF CARE
Problem: Self Harm/Suicidality  Goal: Will have no self-injury during hospital stay  Description: INTERVENTIONS:  1. Ensure constant observer at bedside with Q15M safety checks  2. Maintain a safe environment  3. Secure patient belongings  4. Ensure family/visitors adhere to safety recommendations  5. Ensure safety tray has been added to patient's diet order  6. Every shift and PRN: Re-assess suicidal risk via Frequent Screener    3/10/2023 0927 by Leyla Salas RN  Outcome: Adequate for Discharge     Problem: Depression  Goal: Will be euthymic at discharge  Description: INTERVENTIONS:  1. Administer medication as ordered  2. Provide emotional support via 1:1 interaction with staff  3. Encourage involvement in milieu/groups/activities  4. Monitor for social isolation  3/10/2023 8236 by Leyla Salas RN  Outcome: Adequate for Discharge     Problem: Behavior  Goal: Pt/Family maintain appropriate behavior and adhere to behavioral management agreement, if implemented  Description: INTERVENTIONS:  1. Assess patient/family's coping skills and  non-compliant behavior (including use of illegal substances)  2. Notify security of behavior or suspected illegal substances which indicate the need for search of the family and/or belongings  3. Encourage verbalization of thoughts and concerns in a socially appropriate manner  4. Utilize positive, consistent limit setting strategies supporting safety of patient, staff and others  5. Encourage participation in the decision making process about the behavioral management agreement  6. If a visitor's behavior poses a threat to safety call refer to organization policy. 7. Initiate consult with , Psychosocial CNS, Spiritual Care as appropriate  3/10/2023 0927 by Leyla Salas RN  Outcome: Adequate for Discharge     Problem: Anxiety  Goal: Will report anxiety at manageable levels  Description: INTERVENTIONS:  1. Administer medication as ordered  2. Teach and rehearse alternative coping skills  3. Provide emotional support with 1:1 interaction with staff  3/10/2023 0927 by Sobia Mcdonough, RN  Outcome: Adequate for Discharge     Problem: Sleep Disturbance  Goal: Will exhibit normal sleeping pattern  Description: INTERVENTIONS:  1. Administer medication as ordered  2. Decrease environmental stimuli, including noise, as appropriate  3. Discourage social isolation and naps during the day  3/10/2023 0927 by Sobia Mcdonough RN  Outcome: Adequate for Discharge

## 2023-03-11 NOTE — PROGRESS NOTES
Per Dr. Gita Riggs, discontinue discharge order until tomorrow when  can evaluate. Supervisor made aware.

## 2023-03-11 NOTE — PROGRESS NOTES
[facility]. FOLLOW-UP NOTE       3/11/2023     Patient was seen and examined in person, Chart reviewed   Patient's case discussed with staff/team    Chief Complaint: suicidal ideations with a plan    Interim History:     Patient reports feeling more depressed. He was supposed to be discharged yesterday, however did not notify  anyone about discharge. Feels suicidal/forgotten, hopeless . Wife did not want to talk to him.patient remains isolated  Appetite:   [] Normal/Unchanged  [] Increased  [x] Decreased      Sleep:       [] Normal/Unchanged  [] Fair       [x] Poor              Energy:    [] Normal/Unchanged  [] Increased  [x] Decreased        SI [x] Present  [] Absent    HI  []Present  [x] Absent     Aggression:  [] yes  [] no    Patient is [] able  [] unable to CONTRACT FOR SAFETY     PAST MEDICAL/PSYCHIATRIC HISTORY:   No past medical history on file. FAMILY/SOCIAL HISTORY:  No family history on file. Social History     Socioeconomic History    Marital status:      Spouse name: Not on file    Number of children: Not on file    Years of education: Not on file    Highest education level: Not on file   Occupational History    Not on file   Tobacco Use    Smoking status: Not on file    Smokeless tobacco: Not on file   Substance and Sexual Activity    Alcohol use: Not on file    Drug use: Not on file    Sexual activity: Not on file   Other Topics Concern    Not on file   Social History Narrative    Not on file     Social Determinants of Health     Financial Resource Strain: Not on file   Food Insecurity: Not on file   Transportation Needs: Not on file   Physical Activity: Not on file   Stress: Not on file   Social Connections: Not on file   Intimate Partner Violence: Not on file   Housing Stability: Not on file           ROS:  [x] All negative/unchanged except if checked.  Explain positive(checked items) below:  [] Constitutional  [] Eyes  [] Ear/Nose/Mouth/Throat  [] Respiratory  [] CV  [] GI  []   [] Musculoskeletal  [] Skin/Breast  [] Neurological  [] Endocrine  [] Heme/Lymph  [] Allergic/Immunologic    Explanation:     MEDICATIONS:    Current Facility-Administered Medications:     naltrexone (DEPADE) tablet 50 mg, 50 mg, Oral, Daily with breakfast, JUAN Ureña CNP, 50 mg at 03/11/23 0916    PARoxetine (PAXIL) tablet 20 mg, 20 mg, Oral, Daily, Abelardo Barragan MD, 20 mg at 03/11/23 0916    ondansetron (ZOFRAN-ODT) disintegrating tablet 4 mg, 4 mg, Oral, Q8H PRN **OR** [DISCONTINUED] ondansetron (ZOFRAN) injection 4 mg, 4 mg, IntraVENous, Q6H PRN, Leann Best MD    polyethylene glycol (GLYCOLAX) packet 17 g, 17 g, Oral, Daily PRN, Leann Best MD    acetaminophen (TYLENOL) tablet 650 mg, 650 mg, Oral, Q4H PRN, Catie Berrios MD    hydrOXYzine HCl (ATARAX) tablet 50 mg, 50 mg, Oral, TID PRN, Catie Berrios MD, 50 mg at 03/10/23 1823    haloperidol (HALDOL) tablet 5 mg, 5 mg, Oral, Q4H PRN **OR** haloperidol lactate (HALDOL) injection 5 mg, 5 mg, IntraMUSCular, Q4H PRN, Catie Berrios MD    traZODone (DESYREL) tablet 50 mg, 50 mg, Oral, Nightly PRN, Catie Berrios MD      Examination:  BP (!) 131/90   Pulse 76   Temp 97.5 °F (36.4 °C)   Resp 18   SpO2 97%   Gait - steady  Medication side effects(SE): denies    Mental Status Examination:    Level of consciousness:  within normal limits   Appearance:  poor grooming and poor hygiene  Behavior/Motor:  psychomotor retardation  Attitude toward examiner:  cooperative  Speech:  increased latency   Mood: depressed  Affect:  mood congruent  Thought processes:  linear and flight of ideas   Thought content:  Obsessions/instrusive thoughts:  of denies  Homocidal ideation denies  Suicidal Ideation:  worsening suicidal ideation  Delusions:  no evidence of delusions  Perceptual Disturbance:  denies any perceptual disturbance  Cognition:  oriented to person, place, and time   Concentration intact  Insight excellent   Judgement good     ASSESSMENT: more depressed  Patient symptoms are:  [] Well controlled  [] Improving  [x] Worsening  [] No change      Diagnosis: MDD severe recurrent  Impulse control disorder  Principal Problem:    Current severe episode of major depressive disorder without psychotic features, unspecified whether recurrent ([facility] [address]  Resolved Problems:    * No resolved hospital problems. *      LABS:    No results for input(s): WBC, HGB, PLT in the last 72 hours. No results for input(s): NA, K, CL, CO2, BUN, CREATININE, GLUCOSE in the last 72 hours. No results for input(s): BILITOT, ALKPHOS, AST, ALT in the last 72 hours. Lab Results   Component Value Date/Time    LABAMPH Neg 03/02/2023 08:00 PM    BARBSCNU Neg 03/02/2023 08:00 PM    LABBENZ Neg 03/02/2023 08:00 PM    LABMETH Neg 03/02/2023 08:00 PM    OPIATESCREENURINE Neg 03/02/2023 08:00 PM    PHENCYCLIDINESCREENURINE Neg 03/02/2023 08:00 PM    ETOH <10 03/02/2023 08:13 PM     Lab Results   Component Value Date/Time    TSH 2.880 03/02/2023 08:13 PM     No results found for: LITHIUM  No results found for: VALPROATE, CBMZ    RISK ASSESSMENT: high of suicide if discharged    Treatment Plan:  Reviewed current Medications with the patient. yes  Risks, benefits, side effects, drug-to-drug interactions and alternatives to treatment were discussed. Collateral information: no  CD evaluationyes  Encourage patient to attend group and other milieu activities.   Discharge planning discussed with the patient and treatment team.    PSYCHOTHERAPY/COUNSELING:  [x] Therapeutic interview  [x] Supportive  [] CBT  [] Ongoing  [] Other    [x] Patient continues to need, on a daily basis, active treatment furnished directly by or requiring the supervision of inpatient psychiatric personnel      Anticipated Length of stay:a week     PLAN:  Increase paxil to 40 Alondra Olivo MD         Electronically signed by Brett Nevarez MD on 3/11/2023 at 12:18 PM

## 2023-03-11 NOTE — PLAN OF CARE
Patient presents depressed and preoccupied. He describes feeling \"shaky\". Patient reports that his wife had hinted that he could not return home but he still thought she would allow it. He is struggling with guilt and shame. He knows he is going to have to adapt and \"move on\", but is struggling with the transition. He feels overwhelmed dealing with his situation. Problem: Self Harm/Suicidality  Goal: Will have no self-injury during hospital stay  Description: INTERVENTIONS:  1. Ensure constant observer at bedside with Q15M safety checks  2. Maintain a safe environment  3. Secure patient belongings  4. Ensure family/visitors adhere to safety recommendations  5. Ensure safety tray has been added to patient's diet order  6. Every shift and PRN: Re-assess suicidal risk via Frequent Screener    Recent Flowsheet Documentation  Taken 3/11/2023 0907 by JUAN Brooke  Will have no self-injury during hospital stay: Maintain a safe environment     Problem: Behavior  Goal: Pt/Family maintain appropriate behavior and adhere to behavioral management agreement, if implemented  Description: INTERVENTIONS:  1. Assess patient/family's coping skills and  non-compliant behavior (including use of illegal substances)  2. Notify security of behavior or suspected illegal substances which indicate the need for search of the family and/or belongings  3. Encourage verbalization of thoughts and concerns in a socially appropriate manner  4. Utilize positive, consistent limit setting strategies supporting safety of patient, staff and others  5. Encourage participation in the decision making process about the behavioral management agreement  6. If a visitor's behavior poses a threat to safety call refer to organization policy.   7. Initiate consult with , Psychosocial CNS, Spiritual Care as appropriate  Recent Flowsheet Documentation  Taken 3/11/2023 0907 by JUAN Brooke  Patient/family maintains appropriate behavior and adheres to behavioral management agreement, if implemented:   Assess patient/familys coping skills and  non-compliant behavior (including use of illegal substances)   Encourage verbalization of thoughts and concerns in a socially appropriate manner     Problem: Anxiety  Goal: Will report anxiety at manageable levels  Description: INTERVENTIONS:  1. Administer medication as ordered  2. Teach and rehearse alternative coping skills  3.  Provide emotional support with 1:1 interaction with staff  Recent Flowsheet Documentation  Taken 3/11/2023 0907 by JUAN Beltran - CNS  Will report anxiety at manageable levels:   Administer medication as ordered   Provide emotional support with 1:1 interaction with staff  Taken 3/10/2023 1934 by Pito Fulton RN  Will report anxiety at manageable levels:   Administer medication as ordered   Provide emotional support with 1:1 interaction with staff   Teach and rehearse alternative coping skills

## 2023-03-11 NOTE — GROUP NOTE
Group Therapy Note    Date: 3/11/2023    Group Start Time: 1600  Group End Time: 1700  Group Topic: Healthy Living/Wellness    [facility]    Charleen Pham RN        Group Therapy Note    Attendees:16/25       Patient's Goal:  Learning the benefits of gratitude,  handout given for reference. Status After Intervention:  Improved    Participation Level:  Active Listener    Participation Quality: Appropriate      Speech:  normal      Thought Process/Content: Logical      Affective Functioning: Congruent      Mood: euthymic      Level of consciousness:  Oriented x4      Response to Learning: Able to verbalize current knowledge/experience      Endings: None Reported    Modes of Intervention: Education, Support, and Socialization      Discipline Responsible: Registered Nurse      Signature:  Charleen Pham RN

## 2023-03-11 NOTE — PROGRESS NOTES
Pt lying awake in bed. Approached pt for his PM assessment, pt stated,'' I felt unsteady on my feet and in my head today. I had no appetite all day. No it didn't feel like anxiety. It started last night after snacks. I didn't tell anyone. I am suppose to stay with a friend at d/c on their couch. I did call some rehab places,thou haven't heard back yet. I cant remember the name.'' Denies current SI,HI,A/V hallucinations. Contracts for safety on the unit. Rates anxiety and depression 4/10 with 10 being the worst.

## 2023-03-11 NOTE — PROGRESS NOTES
Explained and gave all am meds, pt denied questions, appeared to see something white in pt mouth, pt stated he swallow them, extra water was given. pt denies problems with the meds.

## 2023-03-12 PROCEDURE — 1240000000 HC EMOTIONAL WELLNESS R&B

## 2023-03-12 PROCEDURE — 6370000000 HC RX 637 (ALT 250 FOR IP): Performed by: REGISTERED NURSE

## 2023-03-12 PROCEDURE — 6370000000 HC RX 637 (ALT 250 FOR IP): Performed by: PSYCHIATRY & NEUROLOGY

## 2023-03-12 RX ADMIN — PAROXETINE HYDROCHLORIDE 30 MG: 30 TABLET, FILM COATED ORAL at 08:52

## 2023-03-12 RX ADMIN — NALTREXONE HYDROCHLORIDE 50 MG: 50 TABLET, FILM COATED ORAL at 08:51

## 2023-03-12 NOTE — GROUP NOTE
Group Therapy Note    Date: 3/12/2023    Group Start Time: 1600  Group End Time: 1700  Group Topic: Healthy Living/Wellness    [facility]    Magui Seymour RN        Group Therapy Note    Attendees:15/25       Patient's Goal:  Learning the benefits of  positive affirmations, handout given for future reference.      Status After Intervention:  Improved    Participation Level: Interactive    Participation Quality: Supportive      Speech:  normal      Thought Process/Content: Logical      Affective Functioning: Congruent      Mood: euthymic      Level of consciousness:  Oriented x4      Response to Learning: Able to verbalize current knowledge/experience      Endings: None Reported    Modes of Intervention: Education, Support, and Socialization      Discipline Responsible: Registered Nurse      Signature:  Magui Seymour RN

## 2023-03-12 NOTE — PLAN OF CARE
Patient presents anxious and withdrawn. He appears worrisome and did not have any interaction with his wife yesterday. He expressed plan to call her today. He has remorse about his gambling. Struggling with his feelings. Denies current suicidal or homicidal intent. Problem: Self Harm/Suicidality  Goal: Will have no self-injury during hospital stay  Description: INTERVENTIONS:  1. Ensure constant observer at bedside with Q15M safety checks  2. Maintain a safe environment  3. Secure patient belongings  4. Ensure family/visitors adhere to safety recommendations  5. Ensure safety tray has been added to patient's diet order  6. Every shift and PRN: Re-assess suicidal risk via Frequent Screener    Recent Flowsheet Documentation  Taken 3/12/2023 1033 by JUAN Young  Will have no self-injury during hospital stay: Maintain a safe environment     Problem: Behavior  Goal: Pt/Family maintain appropriate behavior and adhere to behavioral management agreement, if implemented  Description: INTERVENTIONS:  1. Assess patient/family's coping skills and  non-compliant behavior (including use of illegal substances)  2. Notify security of behavior or suspected illegal substances which indicate the need for search of the family and/or belongings  3. Encourage verbalization of thoughts and concerns in a socially appropriate manner  4. Utilize positive, consistent limit setting strategies supporting safety of patient, staff and others  5. Encourage participation in the decision making process about the behavioral management agreement  6. If a visitor's behavior poses a threat to safety call refer to organization policy.   7. Initiate consult with , Psychosocial CNS, Spiritual Care as appropriate  Recent Flowsheet Documentation  Taken 3/12/2023 1033 by JUAN Young  Patient/family maintains appropriate behavior and adheres to behavioral management agreement, if implemented: Assess patient/familys coping skills and  non-compliant behavior (including use of illegal substances)     Problem: Anxiety  Goal: Will report anxiety at manageable levels  Description: INTERVENTIONS:  1. Administer medication as ordered  2. Teach and rehearse alternative coping skills  3. Provide emotional support with 1:1 interaction with staff  Recent Flowsheet Documentation  Taken 3/12/2023 1033 by JUAN Rodas - CNS  Will report anxiety at manageable levels: Administer medication as ordered     Problem: Decision Making  Goal: Pt/Family able to effectively weigh alternatives and participate in decision making related to treatment and care  Description: INTERVENTIONS:  1. Determine when there are differences between patient's view, family's view, and healthcare provider's view of condition  2. Facilitate patient and family articulation of goals for care  3. Help patient and family identify pros/cons of alternative solutions  4. Provide information as requested by patient/family  5. Respect patient/family right to receive or not to receive information  6. Serve as a liaison between patient and family and health care team  7.  Initiate Consults from Ethics, Palliative Care or initiate [address] [facility] [address] as is appropriate  Outcome: Progressing

## 2023-03-12 NOTE — GROUP NOTE
Group Therapy Note    Date: 3/11/2023    Group Start Time: 1945  Group End Time: 2030  Group Topic: Wrap-Up    [facility]    Harpal Harding RN    To attend wrap up group and state whether or not goal was met. Group Therapy Note      Attendees: 17/25       Patient's Goal:  Pt reports meeting goal today. Notes:  Pt reports having a good day today, sitting and talking with peers.      Status After Intervention:  Unchanged    Participation Level: Interactive    Participation Quality: Appropriate      Speech:  normal      Thought Process/Content: Logical      Affective Functioning: Congruent      Mood: euthymic      Level of consciousness:  Alert and Oriented x4      Response to Learning: Capable of insight      Endings: None Reported    Modes of Intervention: Support      Discipline Responsible: Registered Nurse      Signature:  Harpal Harding RN

## 2023-03-12 NOTE — PROGRESS NOTES
Pt visible on unit throughout evening, social with select peers. Pt has sad/flat affect at times but brightens upon approach. Denies SI, HI, or AVH. Reports some depression. Appears unmotivated- unsure where he will stay at d/c at this time. Good sleep/ good appetite.

## 2023-03-12 NOTE — GROUP NOTE
Group Therapy Note    Date: 3/11/2023    Group Start Time: 2030  Group End Time: 2100  Group Topic: Recreational    [facility]    Meryl Lorenzana RN    To attend and participate in recreation group. Group Therapy Note    Attendees: 17/25             Pt attended and participated in group.          Signature:  Meryl Lorenzana RN

## 2023-03-13 VITALS
HEART RATE: 86 BPM | DIASTOLIC BLOOD PRESSURE: 92 MMHG | TEMPERATURE: 98.1 F | SYSTOLIC BLOOD PRESSURE: 123 MMHG | RESPIRATION RATE: 20 BRPM | OXYGEN SATURATION: 98 %

## 2023-03-13 PROCEDURE — 1240000000 HC EMOTIONAL WELLNESS R&B

## 2023-03-13 PROCEDURE — 6370000000 HC RX 637 (ALT 250 FOR IP): Performed by: PSYCHIATRY & NEUROLOGY

## 2023-03-13 PROCEDURE — 6370000000 HC RX 637 (ALT 250 FOR IP): Performed by: REGISTERED NURSE

## 2023-03-13 RX ORDER — PAROXETINE 30 MG/1
30 TABLET, FILM COATED ORAL DAILY
Qty: 30 TABLET | Refills: 0 | Status: SHIPPED | OUTPATIENT
Start: 2023-03-14 | End: 2023-03-14 | Stop reason: SDUPTHER

## 2023-03-13 RX ADMIN — NALTREXONE HYDROCHLORIDE 50 MG: 50 TABLET, FILM COATED ORAL at 09:23

## 2023-03-13 RX ADMIN — PAROXETINE HYDROCHLORIDE 30 MG: 30 TABLET, FILM COATED ORAL at 09:23

## 2023-03-13 NOTE — GROUP NOTE
Group Therapy Note    Date: 3/13/2023    Group Start Time: 1600  Group End Time: 36  Group Topic: Healthy Living/Wellness    [facility]    Jorge Chirinos RN        Group Therapy Note    Attendees: 19/19       Patient's Goal:  To attend group     Notes:  Good participation    Status After Intervention:  Unchanged    Participation Level:  Active Listener    Participation Quality: Appropriate      Speech:  normal      Thought Process/Content: Logical      Affective Functioning: Congruent      Mood: euthymic      Level of consciousness:  Alert      Response to Learning: Progressing to goal      Endings: None Reported    Modes of Intervention: Support      Discipline Responsible: Registered Nurse      Signature:  Jorge Chirinos RN

## 2023-03-13 NOTE — PLAN OF CARE
Minimal interaction with peers. Knows he needs to move forward but expressing shame and guilt. Affect is depressed and preoccupied. No SI or HI expressed. Problem: Self Harm/Suicidality  Goal: Will have no self-injury during hospital stay  Description: INTERVENTIONS:  1. Ensure constant observer at bedside with Q15M safety checks  2. Maintain a safe environment  3. Secure patient belongings  4. Ensure family/visitors adhere to safety recommendations  5. Ensure safety tray has been added to patient's diet order  6. Every shift and PRN: Re-assess suicidal risk via Frequent Screener    Recent Flowsheet Documentation  Taken 3/12/2023 1033 by JUAN Beltran  Will have no self-injury during hospital stay: Maintain a safe environment     Problem: Behavior  Goal: Pt/Family maintain appropriate behavior and adhere to behavioral management agreement, if implemented  Description: INTERVENTIONS:  1. Assess patient/family's coping skills and  non-compliant behavior (including use of illegal substances)  2. Notify security of behavior or suspected illegal substances which indicate the need for search of the family and/or belongings  3. Encourage verbalization of thoughts and concerns in a socially appropriate manner  4. Utilize positive, consistent limit setting strategies supporting safety of patient, staff and others  5. Encourage participation in the decision making process about the behavioral management agreement  6. If a visitor's behavior poses a threat to safety call refer to organization policy.   7. Initiate consult with , Psychosocial CNS, Spiritual Care as appropriate  Recent Flowsheet Documentation  Taken 3/12/2023 1033 by JUAN Beltran  Patient/family maintains appropriate behavior and adheres to behavioral management agreement, if implemented: Assess patient/familys coping skills and  non-compliant behavior (including use of illegal substances)     Problem: Anxiety  Goal: Will report anxiety at manageable levels  Description: INTERVENTIONS:  1. Administer medication as ordered  2. Teach and rehearse alternative coping skills  3. Provide emotional support with 1:1 interaction with staff  Recent Flowsheet Documentation  Taken 3/12/2023 1033 by JUAN Romo  Will report anxiety at manageable levels: Administer medication as ordered     Problem: Decision Making  Goal: Pt/Family able to effectively weigh alternatives and participate in decision making related to treatment and care  Description: INTERVENTIONS:  1. Determine when there are differences between patient's view, family's view, and healthcare provider's view of condition  2. Facilitate patient and family articulation of goals for care  3. Help patient and family identify pros/cons of alternative solutions  4. Provide information as requested by patient/family  5. Respect patient/family right to receive or not to receive information  6. Serve as a liaison between patient and family and health care team  7.  Initiate Consults from Ethics, Palliative Care or initiate [address] [facility] [address] as is appropriate  3/12/2023 2308 by JUAN oRmo  Outcome: Progressing  3/12/2023 1041 by JUAN Romo  Outcome: Progressing
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
## 2023-03-13 NOTE — GROUP NOTE
Group Therapy Note    Date: 3/12/2023    Group Start Time: 1945  Group End Time: 2015  Group Topic: Wrap-Up    [facility]    Noel Allen RN    To attend wrap up group and state whether or not goal was met. Group Therapy Note    Attendees: 15/25       Pt attended and participated in wrap up group.      Signature:  Noel Allen RN
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
## 2023-03-13 NOTE — PROGRESS NOTES
Behavioral Services                                              Medicare Re-Certification    I certify that the inpatient psychiatric hospital services furnished since the previous certification/re-certification were, and continue to be, medically necessary for;    [x] (1) Treatment which could reasonably be expected to improve the patient's condition,    [x] (2) Or for diagnostic study. Estimated length of stay/service 1 day    Plan for post-hospital care home with OP services    This patient continues to need, on a daily basis, active treatment furnished directly by or requiring the supervision of inpatient psychiatric personnel.     Electronically signed by JUAN Whitaker CNP on 3/13/2023 at 10:59 AM

## 2023-03-13 NOTE — PROGRESS NOTES
Patient endorses S/I with intent to \"follow through\" with his original plan to jump off a bridge if he gets discharged. Patient stated he has no intent to harm himself here in the unit but he does state \"I will follow through with my plan if I am discharged\". Patient stated he is severely depressed and his wife does not want him back home. Patient denies H/I at this time. Patient has been seen out on unit at times but mainly isolates to room. Patient is able to make needs known. Will continue to monitor and document as needed.  Electronically signed by Mio Otero LPN on 9/39/7893 at 9:15 PM

## 2023-03-13 NOTE — PROGRESS NOTES
Pt. declined to attend the 0900 community meeting, despite staff encouragement.  Goal - \"Take one day at a time\" Electronically signed by Olga Nye, [address] on 3/13/2023 at 9:28 AM

## 2023-03-13 NOTE — PLAN OF CARE
Patient is alert and oriented x 4, he walks independently with a steady gait. Patient is reporting poor sleep and poor appetite. Problem: Decision Making  Goal: Pt/Family able to effectively weigh alternatives and participate in decision making related to treatment and care  Description: INTERVENTIONS:  1. Determine when there are differences between patient's view, family's view, and healthcare provider's view of condition  2. Facilitate patient and family articulation of goals for care  3. Help patient and family identify pros/cons of alternative solutions  4. Provide information as requested by patient/family  5. Respect patient/family right to receive or not to receive information  6. Serve as a liaison between patient and family and health care team  7.  Initiate Consults from Ethics, Palliative Care or initiate [address] as is appropriate  Outcome: Progressing  Flowsheets (Taken 3/13/2023 [id number])  Patient/family able to effectively weigh alternatives and participate in decision making related to treatment and care: Provide information as requested by patient/family

## 2023-03-13 NOTE — PROGRESS NOTES
[facility]un Monzon [facility]. FOLLOW-UP NOTE       3/12/2023     Patient was seen and examined in person, Chart reviewed   Patient's case discussed with staff/team    Chief Complaint: suicidal ideations with a plan    Interim History:     Very depressed  Stays in room  Tearful when discussing his options and losses. States he feels like he is a biggest disappointment to his family  Thinking more about suicide then yesterday. Feels cornered. Appetite:   [] Normal/Unchanged  [] Increased  [x] Decreased      Sleep:       [] Normal/Unchanged  [] Fair       [x] Poor              Energy:    [] Normal/Unchanged  [] Increased  [x] Decreased        SI [x] Present  [] Absent    HI  []Present  [x] Absent     Aggression:  [] yes  [x] no    Patient is [] able  [x] unable to CONTRACT FOR SAFETY     PAST MEDICAL/PSYCHIATRIC HISTORY:   No past medical history on file. FAMILY/SOCIAL HISTORY:  No family history on file. Social History     Socioeconomic History    Marital status:      Spouse name: Not on file    Number of children: Not on file    Years of education: Not on file    Highest education level: Not on file   Occupational History    Not on file   Tobacco Use    Smoking status: Not on file    Smokeless tobacco: Not on file   Substance and Sexual Activity    Alcohol use: Not on file    Drug use: Not on file    Sexual activity: Not on file   Other Topics Concern    Not on file   Social History Narrative    Not on file     Social Determinants of Health     Financial Resource Strain: Not on file   Food Insecurity: Not on file   Transportation Needs: Not on file   Physical Activity: Not on file   Stress: Not on file   Social Connections: Not on file   Intimate Partner Violence: Not on file   Housing Stability: Not on file           ROS:  [x] All negative/unchanged except if checked.  Explain positive(checked items) below:  [] Constitutional  [] Eyes  [] Ear/Nose/Mouth/Throat  [] Respiratory  [] CV  [] GI  []   [] Musculoskeletal  [] Skin/Breast  [] Neurological  [] Endocrine  [] Heme/Lymph  [] Allergic/Immunologic    Explanation:     MEDICATIONS:    Current Facility-Administered Medications:     PARoxetine (PAXIL) tablet 30 mg, 30 mg, Oral, Daily, Bryce Park MD, 30 mg at 03/12/23 0852    naltrexone (DEPADE) tablet 50 mg, 50 mg, Oral, Daily with breakfast, JUAN Patterson CNP, 50 mg at 03/12/23 0851    ondansetron (ZOFRAN-ODT) disintegrating tablet 4 mg, 4 mg, Oral, Q8H PRN **OR** [DISCONTINUED] ondansetron (ZOFRAN) injection 4 mg, 4 mg, IntraVENous, Q6H PRN, Anisa Rajput MD    polyethylene glycol (GLYCOLAX) packet 17 g, 17 g, Oral, Daily PRN, Anisa Rajput MD    acetaminophen (TYLENOL) tablet 650 mg, 650 mg, Oral, Q4H PRN, Preet Kruger MD    hydrOXYzine HCl (ATARAX) tablet 50 mg, 50 mg, Oral, TID PRN, Preet Kruger MD, 50 mg at 03/10/23 1823    haloperidol (HALDOL) tablet 5 mg, 5 mg, Oral, Q4H PRN **OR** haloperidol lactate (HALDOL) injection 5 mg, 5 mg, IntraMUSCular, Q4H PRN, Preet Kruger MD    traZODone (DESYREL) tablet 50 mg, 50 mg, Oral, Nightly PRN, Preet Kruger MD      Examination:  BP (!) 136/90   Pulse 82   Temp 97.7 °F (36.5 °C)   Resp 18   SpO2 97%   Gait - steady  Medication side effects(SE): denies    Mental Status Examination:    Level of consciousness:  within normal limits   Appearance:  poor grooming and poor hygiene  Behavior/Motor:  psychomotor retardation  Attitude toward examiner:  cooperative  Speech:  increased latency   Mood: depressed  Affect:  mood congruent  Thought processes:  linear and flight of ideas   Thought content:  Obsessions/instrusive thoughts:  of denies  Homocidal ideation denies  Suicidal Ideation:  worsening suicidal ideation  Delusions:  no evidence of delusions  Perceptual Disturbance:  denies any perceptual disturbance  Cognition:  oriented to person, place, and time   Concentration intact  Insight excellent   Judgement good ASSESSMENT: more depressed  Patient symptoms are:  [] Well controlled  [] Improving  [x] Worsening  [] No change      Diagnosis: MDD severe recurrent  Impulse control disorder  Principal Problem:    Current severe episode of major depressive disorder without psychotic features, unspecified whether recurrent ([facility]  Resolved Problems:    * No resolved hospital problems. *      LABS:    No results for input(s): WBC, HGB, PLT in the last 72 hours. No results for input(s): NA, K, CL, CO2, BUN, CREATININE, GLUCOSE in the last 72 hours. No results for input(s): BILITOT, ALKPHOS, AST, ALT in the last 72 hours. Lab Results   Component Value Date/Time    LABAMPH Neg 03/02/2023 08:00 PM    BARBSCNU Neg 03/02/2023 08:00 PM    LABBENZ Neg 03/02/2023 08:00 PM    LABMETH Neg 03/02/2023 08:00 PM    OPIATESCREENURINE Neg 03/02/2023 08:00 PM    PHENCYCLIDINESCREENURINE Neg 03/02/2023 08:00 PM    ETOH <10 03/02/2023 08:13 PM     Lab Results   Component Value Date/Time    TSH 2.880 03/02/2023 08:13 PM     No results found for: LITHIUM  No results found for: VALPROATE, CBMZ    RISK ASSESSMENT: high of suicide if discharged    Treatment Plan:  Reviewed current Medications with the patient. yes  Risks, benefits, side effects, drug-to-drug interactions and alternatives to treatment were discussed. Collateral information: no  CD evaluationyes  Encourage patient to attend group and other milieu activities.   Discharge planning discussed with the patient and treatment team.    PSYCHOTHERAPY/COUNSELING:  [x] Therapeutic interview  [x] Supportive  [] CBT  [] Ongoing  [] Other    [x] Patient continues to need, on a daily basis, active treatment furnished directly by or requiring the supervision of inpatient psychiatric personnel      Anticipated Length of stay:a week     PLAN:  Increase paxil to 30 Yuli Renner MD         Electronically signed by Florence Baptiste MD on 3/12/2023 at 10:33 PM

## 2023-03-13 NOTE — GROUP NOTE
Group Therapy Note    Date: 3/12/2023    Group Start Time: 5797  Group End Time: 1945  Group Topic: Recreational    [facility] 3W I    Constantine Boss RN    To attend recreational group activities with peers, playing Wii games and watching an awards show on the TV. Group Therapy Note    Attendees: 15/25         Pt attended and participated in recreational group activities.      Signature:  Constantine Boss RN

## 2023-03-13 NOTE — PROGRESS NOTES
Pt. refused to attend the 1000 skills group, despite staff encouragement. Electronically signed by ISIS Russo on 3/13/2023 at 11:32 AM

## 2023-03-13 NOTE — GROUP NOTE
Group Therapy Note    Date: 3/13/2023    Group Start Time: 1400  Group End Time: 1430  Group Topic: Healthy Living/Wellness    [facility] 3W I    Lore Carrasquillo RN        Group Therapy Note    Attendees: 7/20       Patient's Goal:  learn coping skills    Notes:      Status After Intervention:  Unchanged    Participation Level:  Active Listener    Participation Quality: Appropriate      Speech:  normal      Thought Process/Content: Logical      Affective Functioning: Congruent      Mood: euthymic      Level of consciousness:  Alert      Response to Learning: Progressing to goal      Endings: None Reported    Modes of Intervention: Education      Discipline Responsible: Registered Nurse      Signature:  Lore Carrasquillo RN

## 2023-03-14 PROCEDURE — 6370000000 HC RX 637 (ALT 250 FOR IP): Performed by: PSYCHIATRY & NEUROLOGY

## 2023-03-14 PROCEDURE — 6370000000 HC RX 637 (ALT 250 FOR IP): Performed by: REGISTERED NURSE

## 2023-03-14 RX ORDER — NALTREXONE HYDROCHLORIDE 50 MG/1
50 TABLET, FILM COATED ORAL
Qty: 15 TABLET | Refills: 2 | Status: SHIPPED | OUTPATIENT
Start: 2023-03-14

## 2023-03-14 RX ORDER — HYDROXYZINE 50 MG/1
50 TABLET, FILM COATED ORAL 3 TIMES DAILY PRN
Qty: 30 TABLET | Refills: 2 | Status: SHIPPED | OUTPATIENT
Start: 2023-03-14 | End: 2023-04-13

## 2023-03-14 RX ORDER — PAROXETINE 30 MG/1
30 TABLET, FILM COATED ORAL DAILY
Qty: 30 TABLET | Refills: 0 | Status: SHIPPED | OUTPATIENT
Start: 2023-03-14

## 2023-03-14 RX ADMIN — PAROXETINE HYDROCHLORIDE 30 MG: 30 TABLET, FILM COATED ORAL at 09:47

## 2023-03-14 RX ADMIN — NALTREXONE HYDROCHLORIDE 50 MG: 50 TABLET, FILM COATED ORAL at 09:47

## 2023-03-14 NOTE — GROUP NOTE
Group Therapy Note    Date: 3/13/2023    Group Start Time: 1630  Group End Time: 3223  Group Topic: Wrap-Up    [facility]W [facility]    Trent Garcia RN        Group Therapy Note    Attendees: 12/19       Patient's Goal:  To get out    Notes:  Progressing    Status After Intervention:  Unchanged    Participation Level:  Active Listener    Participation Quality: Appropriate      Speech:  normal      Thought Process/Content: Logical      Affective Functioning: Congruent      Mood: euthymic      Level of consciousness:  Alert      Response to Learning: Progressing to goal      Endings: None Reported    Modes of Intervention: Support      Discipline Responsible: Registered Nurse      Signature:  Trent Garcia RN

## 2023-03-14 NOTE — CARE COORDINATION
3/14/23 @ 10:00    Spoke with patient regarding discharge plans. Patient stated he is not interested in going to the [facility] as \" they set you up to get out on your own. \"  He would like to discharge to [facility]. Per patient, he called [employer] [phone] on  3/14/24 @ 9:30 AM   Currently waiting for a response from [employer]. 11:30     Patient approached  and stated that he decided to go to [facility]. Spoke with Ricardo Barba at [facility] [phone]  Sri Westfall stated they have a bed available for patient today. Pt will discharge to [facility] today.

## 2023-03-14 NOTE — PROGRESS NOTES
Pt. declined to attend the 0900 community meeting, despite staff encouragement.  Goal - \"To go home\" Electronically signed by ISIS Anderson on 3/14/2023 at 9:48 AM

## 2023-03-14 NOTE — CARE COORDINATION
Spoke with Maral at [facility]. Informed her of patients discharge at [facility]. They are expecting him.  [address]

## 2023-03-14 NOTE — PLAN OF CARE
Problem: Decision Making  Goal: Pt/Family able to effectively weigh alternatives and participate in decision making related to treatment and care  Description: INTERVENTIONS:  1. Determine when there are differences between patient's view, family's view, and healthcare provider's view of condition  2. Facilitate patient and family articulation of goals for care  3. Help patient and family identify pros/cons of alternative solutions  4. Provide information as requested by patient/family  5. Respect patient/family right to receive or not to receive information  6. Serve as a liaison between patient and family and health care team  7. Initiate Consults from Ethics, Palliative Care or initiate [facility] as is appropriate  3/13/2023 2114 by Kacey Decker RN  Outcome: Progressing  3/13/2023 1556 by Carl Campos RN  Outcome: Progressing  Flowsheets (Taken 3/13/2023 1553)  Patient/family able to effectively weigh alternatives and participate in decision making related to treatment and care: Provide information as requested by patient/family   Pt oou watching tv. Noted to pace unit at brief intervals. Met with pt and pt denies current SI,HI,A/V hallucinations. Contracts for safety on the unit. Rates depression /anxiety level 3-4/10 with 10 being the worst. Reports poor sleep last night due to having intermittent SI. Reports appetite improved today at supper. Reports possible D/C to [facility] or [address] [place] [address]. Affect appropriate,good eye contact.

## 2023-03-14 NOTE — PROGRESS NOTES
Pt. refused to attend the 1000 skills group, despite staff encouragement.  Electronically signed by Renea Fernandez, [address] on 3/14/2023 at 11:38 AM

## 2023-03-14 NOTE — FLOWSHEET NOTE
Pt advised that bed is available at [facility]. Reviewed discharge instructions with patient. Pt verbalized understanding. Escorted to ED to wait for cab with security.

## 2023-03-14 NOTE — PLAN OF CARE
Expressed worry about housing and considering both [address]. Still has job, and worries about getting help for housing. No current SI, HI, or hallucinations. Knows he needs to deal with people not trusting him. Isolates to room. Low energy. Problem: Self Harm/Suicidality  Goal: Will have no self-injury during hospital stay  Description: INTERVENTIONS:  1. Ensure constant observer at bedside with Q15M safety checks  2. Maintain a safe environment  3. Secure patient belongings  4. Ensure family/visitors adhere to safety recommendations  5. Ensure safety tray has been added to patient's diet order  6. Every shift and PRN: Re-assess suicidal risk via Frequent Screener    Recent Flowsheet Documentation  Taken 3/14/2023 0917 by JUAN Ashley  Will have no self-injury during hospital stay: Maintain a safe environment     Problem: Behavior  Goal: Pt/Family maintain appropriate behavior and adhere to behavioral management agreement, if implemented  Description: INTERVENTIONS:  1. Assess patient/family's coping skills and  non-compliant behavior (including use of illegal substances)  2. Notify security of behavior or suspected illegal substances which indicate the need for search of the family and/or belongings  3. Encourage verbalization of thoughts and concerns in a socially appropriate manner  4. Utilize positive, consistent limit setting strategies supporting safety of patient, staff and others  5. Encourage participation in the decision making process about the behavioral management agreement  6. If a visitor's behavior poses a threat to safety call refer to organization policy.   7. Initiate consult with , Psychosocial CNS, Spiritual Care as appropriate  Recent Flowsheet Documentation  Taken 3/14/2023 0917 by JUAN Ashley  Patient/family maintains appropriate behavior and adheres to behavioral management agreement, if implemented: Assess patient/familys coping skills and  non-compliant behavior (including use of illegal substances)     Problem: Anxiety  Goal: Will report anxiety at manageable levels  Description: INTERVENTIONS:  1. Administer medication as ordered  2. Teach and rehearse alternative coping skills  3. Provide emotional support with 1:1 interaction with staff  Recent Flowsheet Documentation  Taken 3/14/2023 0917 by JUAN Vincent  Will report anxiety at manageable levels:   Administer medication as ordered   Provide emotional support with 1:1 interaction with staff     Problem: Decision Making  Goal: Pt/Family able to effectively weigh alternatives and participate in decision making related to treatment and care  Description: INTERVENTIONS:  1. Determine when there are differences between patient's view, family's view, and healthcare provider's view of condition  2. Facilitate patient and family articulation of goals for care  3. Help patient and family identify pros/cons of alternative solutions  4. Provide information as requested by patient/family  5. Respect patient/family right to receive or not to receive information  6. Serve as a liaison between patient and family and health care team  7.  Initiate Consults from Ethics, Palliative Care or initiate [facility] as is appropriate  3/14/2023 0929 by JUAN Vincent  Outcome: Progressing  3/13/2023 2114 by Farzad Tenorio RN  Outcome: Progressing

## 2023-04-04 ENCOUNTER — HOSPITAL ENCOUNTER (INPATIENT)
Age: 58
LOS: 1 days | Discharge: HOME | End: 2023-04-06
Attending: STUDENT IN AN ORGANIZED HEALTH CARE EDUCATION/TRAINING PROGRAM | Admitting: INTERNAL MEDICINE
Payer: COMMERCIAL

## 2023-04-04 ENCOUNTER — APPOINTMENT (OUTPATIENT)
Dept: GENERAL RADIOLOGY | Age: 58
End: 2023-04-04
Payer: COMMERCIAL

## 2023-04-04 ENCOUNTER — APPOINTMENT (OUTPATIENT)
Dept: CT IMAGING | Age: 58
End: 2023-04-04
Payer: COMMERCIAL
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
## 2023-04-04 DIAGNOSIS — S01.01XA LACERATION OF SCALP, INITIAL ENCOUNTER: ICD-10-CM

## 2023-04-04 DIAGNOSIS — S09.90XA INJURY OF HEAD, INITIAL ENCOUNTER: Primary | ICD-10-CM

## 2023-04-04 DIAGNOSIS — R56.9 SEIZURE-LIKE ACTIVITY (HCC): ICD-10-CM

## 2023-04-04 LAB
ACANTHOCYTES BLD QL SMEAR: ABNORMAL
ALBUMIN SERPL-MCNC: 4.3 G/DL (ref 3.5–4.6)
ALP SERPL-CCNC: 92 U/L (ref 35–104)
ALT SERPL-CCNC: 44 U/L (ref 0–41)
AMMONIA PLAS-SCNC: 54 UMOL/L (ref 16–60)
AMPHET UR QL SCN: NORMAL
ANION GAP SERPL CALCULATED.3IONS-SCNC: 28 MEQ/L (ref 9–15)
ANISOCYTOSIS BLD QL SMEAR: ABNORMAL
APAP SERPL-MCNC: <5 UG/ML (ref 10–30)
APTT PPP: 28.7 SEC (ref 24.4–36.8)
AST SERPL-CCNC: 38 U/L (ref 0–40)
BACTERIA URNS QL MICRO: NEGATIVE /HPF
BARBITURATES UR QL SCN: NORMAL
BASOPHILS # BLD: 0 K/UL (ref 0–0.2)
BASOPHILS NFR BLD: 0.6 %
BENZODIAZ UR QL SCN: NORMAL
BILIRUB SERPL-MCNC: 0.5 MG/DL (ref 0.2–0.7)
BILIRUB UR QL STRIP: NEGATIVE
BUN SERPL-MCNC: 17 MG/DL (ref 6–20)
CALCIUM SERPL-MCNC: 9.1 MG/DL (ref 8.5–9.9)
CANNABINOIDS UR QL SCN: NORMAL
CHLORIDE SERPL-SCNC: 100 MEQ/L (ref 95–107)
CHOLEST SERPL-MCNC: 181 MG/DL (ref 0–199)
CK SERPL-CCNC: 348 U/L (ref 0–190)
CLARITY UR: CLEAR
CO2 SERPL-SCNC: 14 MEQ/L (ref 20–31)
COCAINE UR QL SCN: NORMAL
COLOR UR: YELLOW
CREAT SERPL-MCNC: 1.22 MG/DL (ref 0.7–1.2)
DRUG SCREEN COMMENT UR-IMP: NORMAL
EOSINOPHIL # BLD: 0.8 K/UL (ref 0–0.7)
EOSINOPHIL NFR BLD: 6 %
EPI CELLS #/AREA URNS AUTO: NORMAL /HPF (ref 0–5)
ERYTHROCYTE [DISTWIDTH] IN BLOOD BY AUTOMATED COUNT: 15.2 % (ref 11.5–14.5)
ETHANOL PERCENT: NORMAL G/DL
ETHANOLAMINE SERPL-MCNC: <10 MG/DL (ref 0–0.08)
FENTANYL SCREEN, URINE: NORMAL
GLOBULIN SER CALC-MCNC: 2.8 G/DL (ref 2.3–3.5)
GLUCOSE SERPL-MCNC: 118 MG/DL (ref 70–99)
GLUCOSE UR STRIP-MCNC: NEGATIVE MG/DL
HCT VFR BLD AUTO: 44.4 % (ref 42–52)
HDLC SERPL-MCNC: 40 MG/DL (ref 40–59)
HGB BLD-MCNC: 14.4 G/DL (ref 14–18)
HGB UR QL STRIP: NEGATIVE
HYALINE CASTS #/AREA URNS AUTO: NORMAL /HPF (ref 0–5)
INR PPP: 1.1
KETONES UR STRIP-MCNC: NEGATIVE MG/DL
LACTATE BLDV-SCNC: 1.7 MMOL/L (ref 0.5–2.2)
LACTATE BLDV-SCNC: 8.2 MMOL/L (ref 0.5–2.2)
LDLC SERPL CALC-MCNC: 95 MG/DL (ref 0–129)
LEUKOCYTE ESTERASE UR QL STRIP: NEGATIVE
LIPASE SERPL-CCNC: 42 U/L (ref 12–95)
LYMPHOCYTES # BLD: 2.4 K/UL (ref 1–4.8)
LYMPHOCYTES NFR BLD: 17 %
MAGNESIUM SERPL-MCNC: 2.2 MG/DL (ref 1.7–2.4)
MCH RBC QN AUTO: 28.1 PG (ref 27–31.3)
MCHC RBC AUTO-ENTMCNC: 32.5 % (ref 33–37)
MCV RBC AUTO: 86.4 FL (ref 79–92.2)
METHADONE UR QL SCN: NORMAL
MONOCYTES # BLD: 0.6 K/UL (ref 0.2–0.8)
MONOCYTES NFR BLD: 4.8 %
NEUTROPHILS # BLD: 9 K/UL (ref 1.4–6.5)
NEUTS BAND NFR BLD MANUAL: 1 %
NEUTS SEG NFR BLD: 70 %
NITRITE UR QL STRIP: NEGATIVE
OPIATES UR QL SCN: NORMAL
OXYCODONE UR QL SCN: NORMAL
PCP UR QL SCN: NORMAL
PH UR STRIP: 5 [PH] (ref 5–9)
PLATELET # BLD AUTO: 277 K/UL (ref 130–400)
PLATELET BLD QL SMEAR: NORMAL
POIKILOCYTOSIS BLD QL SMEAR: ABNORMAL
POTASSIUM SERPL-SCNC: 3.2 MEQ/L (ref 3.4–4.9)
PROPOXYPH UR QL SCN: NORMAL
PROT SERPL-MCNC: 7.1 G/DL (ref 6.3–8)
PROT UR STRIP-MCNC: 30 MG/DL
PROTHROMBIN TIME: 14.1 SEC (ref 12.3–14.9)
RBC # BLD AUTO: 5.14 M/UL (ref 4.7–6.1)
RBC #/AREA URNS AUTO: NORMAL /HPF (ref 0–5)
SALICYLATES SERPL-MCNC: <0.3 MG/DL (ref 15–30)
SARS-COV-2 RDRP RESP QL NAA+PROBE: NOT DETECTED
SODIUM SERPL-SCNC: 142 MEQ/L (ref 135–144)
SP GR UR STRIP: 1.02 (ref 1–1.03)
TRIGL SERPL-MCNC: 229 MG/DL (ref 0–150)
TROPONIN T SERPL-MCNC: <0.01 NG/ML (ref 0–0.01)
TSH REFLEX: 3.07 UIU/ML (ref 0.44–3.86)
URINE REFLEX TO CULTURE: ABNORMAL
UROBILINOGEN UR STRIP-ACNC: 0.2 E.U./DL
VARIANT LYMPHS NFR BLD: 2 %
WBC # BLD AUTO: 12.7 K/UL (ref 4.8–10.8)
WBC #/AREA URNS AUTO: NORMAL /HPF (ref 0–5)

## 2023-04-04 PROCEDURE — 83690 ASSAY OF LIPASE: CPT

## 2023-04-04 PROCEDURE — 82140 ASSAY OF AMMONIA: CPT

## 2023-04-04 PROCEDURE — 6360000002 HC RX W HCPCS: Performed by: STUDENT IN AN ORGANIZED HEALTH CARE EDUCATION/TRAINING PROGRAM

## 2023-04-04 PROCEDURE — 85610 PROTHROMBIN TIME: CPT

## 2023-04-04 PROCEDURE — 81001 URINALYSIS AUTO W/SCOPE: CPT

## 2023-04-04 PROCEDURE — 80143 DRUG ASSAY ACETAMINOPHEN: CPT

## 2023-04-04 PROCEDURE — 90715 TDAP VACCINE 7 YRS/> IM: CPT | Performed by: STUDENT IN AN ORGANIZED HEALTH CARE EDUCATION/TRAINING PROGRAM

## 2023-04-04 PROCEDURE — 80053 COMPREHEN METABOLIC PANEL: CPT

## 2023-04-04 PROCEDURE — 6370000000 HC RX 637 (ALT 250 FOR IP): Performed by: STUDENT IN AN ORGANIZED HEALTH CARE EDUCATION/TRAINING PROGRAM

## 2023-04-04 PROCEDURE — 85025 COMPLETE CBC W/AUTO DIFF WBC: CPT

## 2023-04-04 PROCEDURE — 80179 DRUG ASSAY SALICYLATE: CPT

## 2023-04-04 PROCEDURE — 2580000003 HC RX 258: Performed by: STUDENT IN AN ORGANIZED HEALTH CARE EDUCATION/TRAINING PROGRAM

## 2023-04-04 PROCEDURE — 85730 THROMBOPLASTIN TIME PARTIAL: CPT

## 2023-04-04 PROCEDURE — 82550 ASSAY OF CK (CPK): CPT

## 2023-04-04 PROCEDURE — 84443 ASSAY THYROID STIM HORMONE: CPT

## 2023-04-04 PROCEDURE — 70450 CT HEAD/BRAIN W/O DYE: CPT

## 2023-04-04 PROCEDURE — 80061 LIPID PANEL: CPT

## 2023-04-04 PROCEDURE — 83735 ASSAY OF MAGNESIUM: CPT

## 2023-04-04 PROCEDURE — 36415 COLL VENOUS BLD VENIPUNCTURE: CPT

## 2023-04-04 PROCEDURE — 82077 ASSAY SPEC XCP UR&BREATH IA: CPT

## 2023-04-04 PROCEDURE — 83605 ASSAY OF LACTIC ACID: CPT

## 2023-04-04 PROCEDURE — 84484 ASSAY OF TROPONIN QUANT: CPT

## 2023-04-04 PROCEDURE — 80307 DRUG TEST PRSMV CHEM ANLYZR: CPT

## 2023-04-04 PROCEDURE — 71045 X-RAY EXAM CHEST 1 VIEW: CPT

## 2023-04-04 PROCEDURE — 90471 IMMUNIZATION ADMIN: CPT | Performed by: STUDENT IN AN ORGANIZED HEALTH CARE EDUCATION/TRAINING PROGRAM

## 2023-04-04 PROCEDURE — 87635 SARS-COV-2 COVID-19 AMP PRB: CPT

## 2023-04-04 PROCEDURE — 93005 ELECTROCARDIOGRAM TRACING: CPT | Performed by: STUDENT IN AN ORGANIZED HEALTH CARE EDUCATION/TRAINING PROGRAM

## 2023-04-04 RX ORDER — 0.9 % SODIUM CHLORIDE 0.9 %
1000 INTRAVENOUS SOLUTION INTRAVENOUS ONCE
Status: COMPLETED | OUTPATIENT
Start: 2023-04-04 | End: 2023-04-04

## 2023-04-04 RX ORDER — POTASSIUM CHLORIDE 20 MEQ/1
40 TABLET, EXTENDED RELEASE ORAL ONCE
Status: COMPLETED | OUTPATIENT
Start: 2023-04-04 | End: 2023-04-04

## 2023-04-04 RX ORDER — 0.9 % SODIUM CHLORIDE 0.9 %
1000 INTRAVENOUS SOLUTION INTRAVENOUS ONCE
Status: COMPLETED | OUTPATIENT
Start: 2023-04-04 | End: 2023-04-05

## 2023-04-04 RX ADMIN — TETANUS TOXOID, REDUCED DIPHTHERIA TOXOID AND ACELLULAR PERTUSSIS VACCINE, ADSORBED 0.5 ML: 5; 2.5; 8; 8; 2.5 SUSPENSION INTRAMUSCULAR at 19:27

## 2023-04-04 RX ADMIN — SODIUM CHLORIDE 1000 ML: 9 INJECTION, SOLUTION INTRAVENOUS at 21:01

## 2023-04-04 RX ADMIN — POTASSIUM CHLORIDE 40 MEQ: 1500 TABLET, EXTENDED RELEASE ORAL at 21:00

## 2023-04-04 RX ADMIN — SODIUM CHLORIDE 1000 ML: 9 INJECTION, SOLUTION INTRAVENOUS at 23:22

## 2023-04-04 RX ADMIN — SODIUM CHLORIDE 1000 ML: 9 INJECTION, SOLUTION INTRAVENOUS at 19:28

## 2023-04-04 ASSESSMENT — ENCOUNTER SYMPTOMS
EYE PAIN: 0
VOMITING: 0
NAUSEA: 0
SHORTNESS OF BREATH: 0
BACK PAIN: 0
ABDOMINAL PAIN: 0
FACIAL SWELLING: 0

## 2023-04-04 NOTE — ED TRIAGE NOTES
Pt arrived after falling from standing. Patient struck back of head. Pt does not remember event, denies being on blood thinners. Patient alert, combative, orient x1 initially. Patient is now A&Ox4, with some confusion/forgetful. PERRLA. Laceration on right occipital with underlying hematoma. Injury noted to left lip and tongue. Patient now calm and cooperative. Denies any pain. VSS, but HR is tachycardic(120-140s). Breathing even and unlabored.  Electronically signed by Aj Molina RN on 4/4/2023 at 7:40 PM

## 2023-04-04 NOTE — ED PROVIDER NOTES
grossly NV Intact, and satting normally on RA. PE as noted above. Labs, EKG and Imaging visualized and interpreted by myself and as noted above. Given findings, clinical presentation most likely consistent w/ ***. Pt was administered   Medications   0.9 % sodium chloride bolus (1,000 mLs IntraVENous New Bag 4/4/23 1928)   Tetanus-Diphth-Acell Pertussis (BOOSTRIX) injection 0.5 mL (0.5 mLs IntraMUSCular Given 4/4/23 1927)       Plan: {[facility]/NHRMCM3:[id number]::\"Patient understanding and amenable to the POC. \"}    CRITICAL CARE TIME   Total CriticalCare time was *** minutes, excluding separately reportable procedures. There was a high probability of clinically significant/life threatening deterioration in the patient's condition which required my urgent intervention. FINAL IMPRESSION    No diagnosis found.       DISPOSITION/PLAN   DISPOSITION        Current Discharge Medication List           Tamica Dowling MD

## 2023-04-04 NOTE — ED NOTES
Per EMS, patient fell backward from standing. Pt is not on blood thinners. Pt combative with EMS upon arrival, oriented x1, placed in cuffs then switched to bilateral wrist soft restraints during transit. Pt has 5x3 laceration to right occipital with underlying hematoma. PERRLA. Patient has lateral contusion to left tongue and lip. Teeth intact. A &Ox4, with confusion. Bilateral superficial abrasions to bilateral wrists. . Patient is now calm and cooperative. VSS.  Electronically signed by Junaid Rothman RN on 4/4/2023 at 7:32 PM       Junaid Rothman RN  04/04/23 1932

## 2023-04-05 ENCOUNTER — APPOINTMENT (OUTPATIENT)
Dept: MRI IMAGING | Age: 58
End: 2023-04-05
Payer: COMMERCIAL

## 2023-04-05 PROBLEM — R56.9 SEIZURE (HCC): Status: ACTIVE | Noted: 2023-04-05

## 2023-04-05 LAB
ANION GAP SERPL CALCULATED.3IONS-SCNC: 10 MEQ/L (ref 9–15)
BUN SERPL-MCNC: 13 MG/DL (ref 6–20)
CALCIUM SERPL-MCNC: 8.2 MG/DL (ref 8.5–9.9)
CHLORIDE SERPL-SCNC: 109 MEQ/L (ref 95–107)
CO2 SERPL-SCNC: 22 MEQ/L (ref 20–31)
CREAT SERPL-MCNC: 0.92 MG/DL (ref 0.7–1.2)
EKG ATRIAL RATE: 123 BPM
EKG P AXIS: 50 DEGREES
EKG P-R INTERVAL: 128 MS
EKG Q-T INTERVAL: 332 MS
EKG QRS DURATION: 80 MS
EKG QTC CALCULATION (BAZETT): 475 MS
EKG R AXIS: 38 DEGREES
EKG T AXIS: 0 DEGREES
EKG VENTRICULAR RATE: 123 BPM
GLUCOSE SERPL-MCNC: 103 MG/DL (ref 70–99)
LACTATE BLDV-SCNC: 1.9 MMOL/L (ref 0.5–2.2)
LV EF: 60 %
LVEF MODALITY: NORMAL
POTASSIUM SERPL-SCNC: 3.8 MEQ/L (ref 3.4–4.9)
SODIUM SERPL-SCNC: 141 MEQ/L (ref 135–144)

## 2023-04-05 PROCEDURE — 80048 BASIC METABOLIC PNL TOTAL CA: CPT

## 2023-04-05 PROCEDURE — 6370000000 HC RX 637 (ALT 250 FOR IP): Performed by: PSYCHIATRY & NEUROLOGY

## 2023-04-05 PROCEDURE — 70553 MRI BRAIN STEM W/O & W/DYE: CPT

## 2023-04-05 PROCEDURE — 82085 ASSAY OF ALDOLASE: CPT

## 2023-04-05 PROCEDURE — 99222 1ST HOSP IP/OBS MODERATE 55: CPT | Performed by: PSYCHIATRY & NEUROLOGY

## 2023-04-05 PROCEDURE — 93010 ELECTROCARDIOGRAM REPORT: CPT | Performed by: INTERNAL MEDICINE

## 2023-04-05 PROCEDURE — 6360000002 HC RX W HCPCS: Performed by: INTERNAL MEDICINE

## 2023-04-05 PROCEDURE — 83605 ASSAY OF LACTIC ACID: CPT

## 2023-04-05 PROCEDURE — 95816 EEG AWAKE AND DROWSY: CPT

## 2023-04-05 PROCEDURE — 83516 IMMUNOASSAY NONANTIBODY: CPT

## 2023-04-05 PROCEDURE — 97165 OT EVAL LOW COMPLEX 30 MIN: CPT

## 2023-04-05 PROCEDURE — 36415 COLL VENOUS BLD VENIPUNCTURE: CPT

## 2023-04-05 PROCEDURE — 97161 PT EVAL LOW COMPLEX 20 MIN: CPT

## 2023-04-05 PROCEDURE — 93306 TTE W/DOPPLER COMPLETE: CPT

## 2023-04-05 PROCEDURE — A9579 GAD-BASE MR CONTRAST NOS,1ML: HCPCS | Performed by: PSYCHIATRY & NEUROLOGY

## 2023-04-05 PROCEDURE — 2580000003 HC RX 258: Performed by: INTERNAL MEDICINE

## 2023-04-05 PROCEDURE — 86235 NUCLEAR ANTIGEN ANTIBODY: CPT

## 2023-04-05 PROCEDURE — 6360000004 HC RX CONTRAST MEDICATION: Performed by: PSYCHIATRY & NEUROLOGY

## 2023-04-05 PROCEDURE — 1210000000 HC MED SURG R&B

## 2023-04-05 RX ORDER — ENOXAPARIN SODIUM 100 MG/ML
40 INJECTION SUBCUTANEOUS DAILY
Status: DISCONTINUED | OUTPATIENT
Start: 2023-04-05 | End: 2023-04-06 | Stop reason: HOSPADM

## 2023-04-05 RX ORDER — SODIUM CHLORIDE 9 MG/ML
INJECTION, SOLUTION INTRAVENOUS CONTINUOUS
Status: DISCONTINUED | OUTPATIENT
Start: 2023-04-05 | End: 2023-04-06 | Stop reason: HOSPADM

## 2023-04-05 RX ORDER — ACETAMINOPHEN 325 MG/1
650 TABLET ORAL EVERY 6 HOURS PRN
Status: DISCONTINUED | OUTPATIENT
Start: 2023-04-05 | End: 2023-04-06 | Stop reason: HOSPADM

## 2023-04-05 RX ORDER — SODIUM CHLORIDE 9 MG/ML
INJECTION, SOLUTION INTRAVENOUS PRN
Status: DISCONTINUED | OUTPATIENT
Start: 2023-04-05 | End: 2023-04-06 | Stop reason: HOSPADM

## 2023-04-05 RX ORDER — SODIUM CHLORIDE 0.9 % (FLUSH) 0.9 %
5-40 SYRINGE (ML) INJECTION EVERY 12 HOURS SCHEDULED
Status: DISCONTINUED | OUTPATIENT
Start: 2023-04-05 | End: 2023-04-06 | Stop reason: HOSPADM

## 2023-04-05 RX ORDER — SODIUM CHLORIDE 0.9 % (FLUSH) 0.9 %
5-40 SYRINGE (ML) INJECTION PRN
Status: DISCONTINUED | OUTPATIENT
Start: 2023-04-05 | End: 2023-04-06 | Stop reason: HOSPADM

## 2023-04-05 RX ORDER — ONDANSETRON 2 MG/ML
4 INJECTION INTRAMUSCULAR; INTRAVENOUS EVERY 6 HOURS PRN
Status: DISCONTINUED | OUTPATIENT
Start: 2023-04-05 | End: 2023-04-06 | Stop reason: HOSPADM

## 2023-04-05 RX ORDER — POLYETHYLENE GLYCOL 3350 17 G/17G
17 POWDER, FOR SOLUTION ORAL DAILY PRN
Status: DISCONTINUED | OUTPATIENT
Start: 2023-04-05 | End: 2023-04-06 | Stop reason: HOSPADM

## 2023-04-05 RX ORDER — LEVETIRACETAM 500 MG/1
500 TABLET ORAL 2 TIMES DAILY
Status: DISCONTINUED | OUTPATIENT
Start: 2023-04-05 | End: 2023-04-06 | Stop reason: HOSPADM

## 2023-04-05 RX ORDER — ONDANSETRON 4 MG/1
4 TABLET, ORALLY DISINTEGRATING ORAL EVERY 8 HOURS PRN
Status: DISCONTINUED | OUTPATIENT
Start: 2023-04-05 | End: 2023-04-06 | Stop reason: HOSPADM

## 2023-04-05 RX ORDER — ACETAMINOPHEN 650 MG/1
650 SUPPOSITORY RECTAL EVERY 6 HOURS PRN
Status: DISCONTINUED | OUTPATIENT
Start: 2023-04-05 | End: 2023-04-06 | Stop reason: HOSPADM

## 2023-04-05 RX ORDER — LORAZEPAM 2 MG/ML
2 INJECTION INTRAMUSCULAR EVERY 5 MIN PRN
Status: DISCONTINUED | OUTPATIENT
Start: 2023-04-05 | End: 2023-04-06 | Stop reason: HOSPADM

## 2023-04-05 RX ADMIN — LEVETIRACETAM 500 MG: 500 TABLET, FILM COATED ORAL at 13:56

## 2023-04-05 RX ADMIN — ENOXAPARIN SODIUM 40 MG: 100 INJECTION SUBCUTANEOUS at 08:34

## 2023-04-05 RX ADMIN — GADOTERIDOL 15 ML: 279.3 INJECTION, SOLUTION INTRAVENOUS at 16:36

## 2023-04-05 RX ADMIN — SODIUM CHLORIDE, PRESERVATIVE FREE 10 ML: 5 INJECTION INTRAVENOUS at 08:36

## 2023-04-05 RX ADMIN — SODIUM CHLORIDE: 9 INJECTION, SOLUTION INTRAVENOUS at 03:28

## 2023-04-05 RX ADMIN — SODIUM CHLORIDE: 9 INJECTION, SOLUTION INTRAVENOUS at 17:18

## 2023-04-05 RX ADMIN — LEVETIRACETAM 500 MG: 500 TABLET, FILM COATED ORAL at 21:21

## 2023-04-05 RX ADMIN — SODIUM CHLORIDE 1000 MG: 9 INJECTION, SOLUTION INTRAVENOUS at 03:30

## 2023-04-05 ASSESSMENT — ENCOUNTER SYMPTOMS
TROUBLE SWALLOWING: 0
NAUSEA: 0
BACK PAIN: 0
COLOR CHANGE: 0
PHOTOPHOBIA: 0
SHORTNESS OF BREATH: 0
CHOKING: 0
VOMITING: 0

## 2023-04-05 NOTE — PLAN OF CARE
See OT evaluation for all goals and OT POC.  Electronically signed by HELEN Sparks/L on 4/5/2023 at 3:11 PM

## 2023-04-05 NOTE — CARE COORDINATION
Case Management Assessment  Initial Evaluation    Date/Time of Evaluation: 4/5/2023 12:32 PM  Assessment Completed by: Gilma Conn RN    If patient is discharged prior to next notation, then this note serves as note for discharge by case management. Patient Name: Norina Siemens                   YOB: 1965  Diagnosis: Seizure ([facility] [R56.9]  Seizure-like activity ([facility] [R56.9]  Injury of head, initial encounter [S56.47WZ]  Fall, initial encounter [W19. XXXA]  Laceration of scalp, initial encounter [S01.01XA]                   Date / Time: 4/4/2023  7:19 PM    Patient Admission Status: Inpatient   Readmission Risk (Low < 19, Mod (19-27), High > 27): Readmission Risk Score: 15.5    Current PCP: Katia Fair, DO  PCP verified by CM? Chart Reviewed: Yes      History Provided by: Patient  Patient Orientation: Alert and Oriented    Patient Cognition: Alert    Hospitalization in the last 30 days (Readmission):  Yes    If yes, Readmission Assessment in CM Navigator will be completed. Advance Directives:      Code Status: Full Code   Patient's Primary Decision Maker is:      Primary Decision Maker: Kami Bruno - Spouse - [phone]    Secondary Decision Maker: George Montgomery - Parent - [phone]    Discharge Planning:    Patient lives with: Other (Comment) Type of Home: Independent Living  Primary Care Giver: Self  Patient Support Systems include: Spouse/Significant Other, Other (Comment) (Chrissy Iniguez)   Current Financial resources:    Current community resources:    Current services prior to admission: Other (Comment)            Current DME:              Type of Home Care services:  None    ADLS  Prior functional level: Independent in ADLs/IADLs  Current functional level: Independent in ADLs/IADLs    PT AM-PAC:   /24  OT AM-PAC:   /24    Family can provide assistance at DC:  Yes  Would you like Case Management to discuss the discharge plan with any other family

## 2023-04-05 NOTE — CONSULTS
IntraVENous Q6H PRN Hutto Guerra, DO        polyethylene glycol (GLYCOLAX) packet 17 g  17 g Oral Daily PRN Hutto Guerra, DO        acetaminophen (TYLENOL) tablet 650 mg  650 mg Oral Q6H PRN Hutto Guerra, DO        Or    acetaminophen (TYLENOL) suppository 650 mg  650 mg Rectal Q6H PRN Hutto Guerra, DO        0.9 % sodium chloride infusion   IntraVENous Continuous Hutto Guerra, DO 75 mL/hr at 04/05/23 0328 New Bag at 04/05/23 0328    levETIRAcetam (KEPPRA) 1,000 mg in sodium chloride 0.9 % 100 mL IVPB  1,000 mg IntraVENous Q12H Hutto Guerra, DO   Stopped at 04/05/23 0345        Objective:   /78   Pulse 81   Temp 97.9 °F (36.6 °C) (Oral)   Resp 18   Ht 5' 6\" (1.676 m)   Wt 193 lb 6 oz (87.7 kg) Comment: 193.6  SpO2 99%   BMI 31.21 kg/m²     Physical Exam  Vitals reviewed. Eyes:      Pupils: Pupils are equal, round, and reactive to light. Cardiovascular:      Rate and Rhythm: Normal rate and regular rhythm. Heart sounds: No murmur heard. Pulmonary:      Effort: Pulmonary effort is normal.      Breath sounds: Normal breath sounds. Abdominal:      General: Bowel sounds are normal.   Musculoskeletal:         General: Normal range of motion. Cervical back: Normal range of motion. Skin:     General: Skin is warm. Neurological:      Mental Status: He is alert and oriented to person, place, and time. Cranial Nerves: No cranial nerve deficit. Sensory: No sensory deficit. Motor: No abnormal muscle tone. Coordination: Coordination normal.      Deep Tendon Reflexes: Reflexes are normal and symmetric. Babinski sign absent on the right side. Babinski sign absent on the left side.       Comments: Minor asymmetry of reflexes notable on the left   Psychiatric:         Mood and Affect: Mood normal.       CT HEAD WO CONTRAST    Result Date: 4/5/2023  EXAMINATION: CT OF THE HEAD WITHOUT CONTRAST  4/4/2023 8:12 pm TECHNIQUE: CT of the head was performed without the

## 2023-04-06 VITALS
TEMPERATURE: 97.7 F | OXYGEN SATURATION: 97 % | SYSTOLIC BLOOD PRESSURE: 125 MMHG | WEIGHT: 193.38 LBS | BODY MASS INDEX: 31.08 KG/M2 | DIASTOLIC BLOOD PRESSURE: 74 MMHG | HEIGHT: 66 IN | RESPIRATION RATE: 20 BRPM | HEART RATE: 91 BPM

## 2023-04-06 LAB
ANION GAP SERPL CALCULATED.3IONS-SCNC: 8 MEQ/L (ref 9–15)
BASOPHILS # BLD: 0 K/UL (ref 0–0.2)
BASOPHILS NFR BLD: 0.6 %
BUN SERPL-MCNC: 10 MG/DL (ref 6–20)
CALCIUM SERPL-MCNC: 8.3 MG/DL (ref 8.5–9.9)
CHLORIDE SERPL-SCNC: 108 MEQ/L (ref 95–107)
CO2 SERPL-SCNC: 26 MEQ/L (ref 20–31)
CREAT SERPL-MCNC: 0.88 MG/DL (ref 0.7–1.2)
EOSINOPHIL # BLD: 0.1 K/UL (ref 0–0.7)
EOSINOPHIL NFR BLD: 0.7 %
ERYTHROCYTE [DISTWIDTH] IN BLOOD BY AUTOMATED COUNT: 15.3 % (ref 11.5–14.5)
GLUCOSE SERPL-MCNC: 93 MG/DL (ref 70–99)
HCT VFR BLD AUTO: 36.2 % (ref 42–52)
HGB BLD-MCNC: 12.2 G/DL (ref 14–18)
LYMPHOCYTES # BLD: 0.8 K/UL (ref 1–4.8)
LYMPHOCYTES NFR BLD: 10.7 %
MCH RBC QN AUTO: 28.7 PG (ref 27–31.3)
MCHC RBC AUTO-ENTMCNC: 33.6 % (ref 33–37)
MCV RBC AUTO: 85.5 FL (ref 79–92.2)
MONOCYTES # BLD: 0.8 K/UL (ref 0.2–0.8)
MONOCYTES NFR BLD: 11.5 %
NEUTROPHILS # BLD: 5.6 K/UL (ref 1.4–6.5)
NEUTS SEG NFR BLD: 76.5 %
PLATELET # BLD AUTO: 201 K/UL (ref 130–400)
POTASSIUM SERPL-SCNC: 3.9 MEQ/L (ref 3.4–4.9)
RBC # BLD AUTO: 4.23 M/UL (ref 4.7–6.1)
SODIUM SERPL-SCNC: 142 MEQ/L (ref 135–144)
WBC # BLD AUTO: 7.3 K/UL (ref 4.8–10.8)

## 2023-04-06 PROCEDURE — 2580000003 HC RX 258: Performed by: INTERNAL MEDICINE

## 2023-04-06 PROCEDURE — 6360000002 HC RX W HCPCS: Performed by: INTERNAL MEDICINE

## 2023-04-06 PROCEDURE — APPSS15 APP SPLIT SHARED TIME 0-15 MINUTES: Performed by: NURSE PRACTITIONER

## 2023-04-06 PROCEDURE — 85025 COMPLETE CBC W/AUTO DIFF WBC: CPT

## 2023-04-06 PROCEDURE — 6370000000 HC RX 637 (ALT 250 FOR IP): Performed by: PSYCHIATRY & NEUROLOGY

## 2023-04-06 PROCEDURE — 80048 BASIC METABOLIC PNL TOTAL CA: CPT

## 2023-04-06 PROCEDURE — 36415 COLL VENOUS BLD VENIPUNCTURE: CPT

## 2023-04-06 PROCEDURE — 99232 SBSQ HOSP IP/OBS MODERATE 35: CPT | Performed by: PSYCHIATRY & NEUROLOGY

## 2023-04-06 RX ORDER — LEVETIRACETAM 500 MG/1
500 TABLET ORAL 2 TIMES DAILY
Qty: 60 TABLET | Refills: 3 | Status: SHIPPED | OUTPATIENT
Start: 2023-04-06

## 2023-04-06 RX ADMIN — ENOXAPARIN SODIUM 40 MG: 100 INJECTION SUBCUTANEOUS at 10:28

## 2023-04-06 RX ADMIN — SODIUM CHLORIDE: 9 INJECTION, SOLUTION INTRAVENOUS at 06:29

## 2023-04-06 RX ADMIN — LEVETIRACETAM 500 MG: 500 TABLET, FILM COATED ORAL at 10:28

## 2023-04-06 ASSESSMENT — ENCOUNTER SYMPTOMS
ABDOMINAL PAIN: 0
SHORTNESS OF BREATH: 0
VOMITING: 0
CHEST TIGHTNESS: 0
WHEEZING: 0
TROUBLE SWALLOWING: 0
COUGH: 0
COLOR CHANGE: 0
NAUSEA: 0

## 2023-04-06 NOTE — FLOWSHEET NOTE
Discharge paperwork reviewed with patient. Follow up appointment with patient PCP made for staple removal.   Patient medications changed to [facility] in [facility] per request called rx in. All questions answered.  arranged and patient discharged via wheelchair with out incident.

## 2023-04-06 NOTE — CARE COORDINATION
Patient to discharge back to the [facility] home at d/c on cleared by neurology. The patient has no needs identified. CM to follow for any new d/c needs or changes to the d/c plan.

## 2023-04-06 NOTE — DISCHARGE SUMMARY
List        STOP taking these medications      hydrOXYzine HCl 50 MG tablet  Commonly known as: ATARAX     PARoxetine 30 MG tablet  Commonly known as: PAXIL            ASK your doctor about these medications      naltrexone 50 MG tablet  Commonly known as: DEPADE  Take 1 tablet by mouth daily (with breakfast)              Disposition:   If discharged to Home, Any [place] [address] needs that were indicated and/or required as been addressed and set up by Social Work. Condition at discharge: good     Activity: activity as tolerated    Total time taken for discharging this patient: 40 minutes. Greater than 70% of time was spent focused exclusively on this patient. Time was taken to review chart, discuss plans with consultants, reconciling medications, discussing plan answering questions with patient.      Sandra Sumner DO  4/6/2023, 1:19 PM  ----------------------------------------------------------------------------------------------------------------------    Audelia Abernathy

## 2023-04-06 NOTE — FLOWSHEET NOTE
Shift assessment complete. Patient A/Ox4. VSS. Head laceration with staples noted to posterior scalp. Dressing changed and CDI. Patient tolerated well. Ambulating with out assistance to restroom with out incident. Patients call light with in reach and bed alarm engaged d/t falls risk. All needs met at this time.

## 2023-04-06 NOTE — PROGRESS NOTES
Appreciate neuro input. Work up in progress. MRI, eeg.
Functional Limits  Follows Commands: Within Functional Limits  Overall Cognitive Status: WFL    Observation/Palpation  Observation: No acute distress noted. Pt pleasant and motivated. ROM:  RLE PROM: WFL  LLE PROM: WFL    Strength:  Strength RLE  Strength RLE: WFL  Strength LLE  Strength LLE: WFL    Neuro:  Balance  Sitting - Static: Good  Sitting - Dynamic: Good  Standing - Static: Good  Standing - Dynamic: Good  Comments: 55/56 heart balance score - indicating low falls risk                      Tone: Normal    Sensation: Intact    Bed mobility  Supine to Sit: Independent  Sit to Supine: Independent    Transfers  Sit to Stand: Independent; Modified independent  Stand to Sit: Independent  Bed to Chair: Independent    Ambulation  Surface: Level tile  Device: No Device  Assistance: Independent  Quality of Gait: normalized  Gait Deviations: None  Distance: 100ft X 2                   Activity Tolerance  Activity Tolerance: Patient tolerated treatment well    Patient Education  Education Given To: Patient  Education Provided: Role of Therapy  Education Method: Verbal  Education Outcome: Verbalized understanding       ASSESSMENT:   Decision Making: Low Complexity  History: high  Exam: High  Clinical Presentation: low    Therapy Prognosis: Good  Barriers to Learning: none         DISCHARGE RECOMMENDATIONS:  Discharge Recommendations: Home independently  No Skilled PT: Safe to return home    Assessment: Completes basic mobility at indep level of function. Denies mobility needs. No further PT indicated at this time. Requires PT Follow-Up: No       PLAN OF CARE:  Physcial Therapy Plan  General Plan: Discharge with evaluation only    Safety Devices  Type of Devices:  All fall risk precautions in place    Goals:  Long Term Goals  Long Term Goal 1: NA    AMPAC (6 CLICK) BASIC MOBILITY  AM-PAC Inpatient Mobility Raw Score : 24     Therapy Time:   Individual   Time In 1421   Time Out 1429   Minutes 8           Chantal Mcclellan,
Tolerance  Activity Tolerance: Patient Tolerated treatment well    D/C Recommendations:  OT D/C RECOMMENDATIONS  REQUIRES OT FOLLOW-UP: No    Equipment Recommendations:  OT Equipment Recommendations  Equipment Needed: No    OT Education:   Patient Education  Education Given To: Patient  Education Provided: Role of Therapy;Plan of Care  Education Method: Verbal  Barriers to Learning: None  Education Outcome: Verbalized understanding    OT Follow Up:   OT D/C RECOMMENDATIONS  REQUIRES OT FOLLOW-UP: No       Assessment/Discharge Disposition:  Assessment: Pt is a 56year old man from home who presents to [address] [place] [address] with seizure. Pt demonstrates no significant functional deficits and requires no physical assist. No acute OT needs identified.   Prognosis: Good  No Skilled OT: Independent with ADL's, Safe to return home  Decision Making: Low Complexity  History: Pt's medical history is moderately complex  Exam: Pt. has no performance deficits  Assistance / Modification: Pt requires no physical assist    AMPAC (Six Click) Self care Score   How much help is needed for putting on and taking off regular lower body clothing?: None  How much help is needed for bathing (which includes washing, rinsing, drying)?: None  How much help is needed for toileting (which includes using toilet, bedpan, or urinal)?: None  How much help is needed for putting on and taking off regular upper body clothing?: None  How much help is needed for taking care of personal grooming?: None  How much help for eating meals?: None  AM-[facility] Inpatient Daily Activity Raw Score: 24  AM-PAC Inpatient ADL T-Scale Score : 57.54  ADL Inpatient CMS 0-100% Score: 0    Therapy key for assistance levels -   Independent/Mod I = Pt. is able to perform task with no assistance but may require a device   Stand by assistance = Pt. does not perform task at an independent level but does not need physical assistance, requires verbal cues  Minimal, Moderate, Maximal Assistance = Pt.
could have been a seizure. Patient was recently admitted to psychiatry with rhabdo which was not explained and possible postictal psychosis is a consideration. Given that this may be his third episode I recommended we continue on Keppra for some time till we investigate. There is some minor asymmetry of reflexes on the left and an MRI of the brain is recommended to make sure he does not have any cortical scars that would explain his symptoms. We will arrange for an EEG as well. 4/6/23:  MRI negative for acute findings. No evidence of AVM. No mention of encephalomalacia. Bilateral hippocampus normal.  EEG completed with report pending  Patient continues on Keppra 500 twice daily. Tolerating without adverse effects or mood changes. CK level mildly elevated. Anti-Sheri antibody, aldolase, anti-smooth muscle antibody pending. I have personally performed a face to face diagnostic evaluation on this patient, reviewed all data and investigations, and am the sole provider of all clinical decisions on the neurological status of this patient. Probable seizures from the history obtained. Patient had an MRI of the brain which I reviewed and is entirely normal there is no encephalomalacia or hippocampal abnormalities. EEG was reviewed and shows no abnormal findings. At this time recommend that we keep him on Keppra. He has not had any side effects of the same. His liver tests are all pending. CPK still remains mildly elevated. Patient be discharged with outpatient follow-up. Findings discussed with the patient. 60% time spent on evaluating patient      Wero Earl MD, [address] American Board of Psychiatry & Neurology  Board Certified in Vascular Neurology  Board Certified in Neuromuscular Medicine  Certified in Neurorehabilitation           Collaborating physicians: Dr Kingsley Earl    Electronically signed by JUAN Alfonso CNP on 4/6/2023 at 12:03 PM

## 2023-04-06 NOTE — DISCHARGE INSTRUCTIONS
Follow up with primary care physician in the next 7 days or sooner if needed. If you do not have a Primary care physician, please schedule an appointment with one. Please ask prior to discharge about a list of local providers. Follow up with cardiology to set up an event monitor     Please return to ER or call 911 if you develop any significant signs or symptoms. I may not have addressed all of your medical illnesses or the abnormal blood work or imaging therefore please ask your PCP to obtain [facility] record to follow up on all of the abnormal labs, imaging and findings that I have and have not addressed during your hospitalization. Discharging you from the hospital does not mean that your medical care ends here and now. You may still need additional work up, investigation, monitoring, and treatment to be handled from this point on by outside providers including your PCP, Specialists and other healthcare providers. For medication questions, contact your retail pharmacy and your PCP. Your medical team at [facility] ([facility] appreciates the opportunity to work with you to get well! Yuri Schwartz, DO  1:01 PM     - Notify neurologist when starting new medications as anti-seizure meds can interact with prescription and nonprescription medicines which may cause increased side effects or decreased efficacy  - Avoid alcohol or illicit drug use as these can lower seizure threshold  - Take seizure medicine exactly as prescribed  - Inform us of any side effects of medications  - Do not stop seizure medication or change dose unless directed to by health care professional  - No driving until seizure free for 3 months. Patient was advised to check with local DMV for out of state or out of country driving law.  Patient was also reminded that DMV may conduct periodic medical update and/or driving test.   - Please keep us updated if you are planning to have children, as some epilepsy medications can affect a developing

## 2023-04-07 LAB
ALDOLASE SERPL-CCNC: 9.2 U/L (ref 1.2–7.6)
ENA JO1 AB TITR SER: 0 AU/ML (ref 0–40)

## 2023-04-14 PROBLEM — R55 VASOVAGAL SYNCOPE: Status: ACTIVE | Noted: 2023-04-14

## 2023-04-16 ENCOUNTER — HOSPITAL ENCOUNTER (EMERGENCY)
Age: 58
Discharge: HOME | End: 2023-04-16
Attending: EMERGENCY MEDICINE
Payer: COMMERCIAL

## 2023-04-16 ENCOUNTER — APPOINTMENT (OUTPATIENT)
Dept: CT IMAGING | Age: 58
End: 2023-04-16
Payer: COMMERCIAL

## 2023-04-16 VITALS
DIASTOLIC BLOOD PRESSURE: 74 MMHG | OXYGEN SATURATION: 96 % | BODY MASS INDEX: 31.02 KG/M2 | HEART RATE: 95 BPM | WEIGHT: 193 LBS | RESPIRATION RATE: 16 BRPM | HEIGHT: 66 IN | SYSTOLIC BLOOD PRESSURE: 126 MMHG | TEMPERATURE: 98.1 F

## 2023-04-16 DIAGNOSIS — G40.909 SEIZURE DISORDER (HCC): ICD-10-CM

## 2023-04-16 DIAGNOSIS — G40.919 BREAKTHROUGH SEIZURE (HCC): Primary | ICD-10-CM

## 2023-04-16 LAB
ALBUMIN SERPL-MCNC: 4.2 G/DL (ref 3.5–4.6)
ALP SERPL-CCNC: 80 U/L (ref 35–104)
ALT SERPL-CCNC: 27 U/L (ref 0–41)
AMPHET UR QL SCN: NORMAL
ANION GAP SERPL CALCULATED.3IONS-SCNC: 12 MEQ/L (ref 9–15)
AST SERPL-CCNC: 26 U/L (ref 0–40)
BACTERIA URNS QL MICRO: NEGATIVE /HPF
BARBITURATES UR QL SCN: NORMAL
BASOPHILS # BLD: 0.1 K/UL (ref 0–0.2)
BASOPHILS NFR BLD: 0.5 %
BENZODIAZ UR QL SCN: NORMAL
BILIRUB SERPL-MCNC: 0.5 MG/DL (ref 0.2–0.7)
BILIRUB UR QL STRIP: NEGATIVE
BUN SERPL-MCNC: 19 MG/DL (ref 6–20)
CALCIUM SERPL-MCNC: 9.3 MG/DL (ref 8.5–9.9)
CANNABINOIDS UR QL SCN: NORMAL
CHLORIDE SERPL-SCNC: 106 MEQ/L (ref 95–107)
CK SERPL-CCNC: 225 U/L (ref 0–190)
CLARITY UR: CLEAR
CO2 SERPL-SCNC: 23 MEQ/L (ref 20–31)
COCAINE UR QL SCN: NORMAL
COLOR UR: YELLOW
CREAT SERPL-MCNC: 1.15 MG/DL (ref 0.7–1.2)
DRUG SCREEN COMMENT UR-IMP: NORMAL
EOSINOPHIL # BLD: 0.1 K/UL (ref 0–0.7)
EOSINOPHIL NFR BLD: 0.7 %
EPI CELLS #/AREA URNS AUTO: ABNORMAL /HPF (ref 0–5)
ERYTHROCYTE [DISTWIDTH] IN BLOOD BY AUTOMATED COUNT: 15.2 % (ref 11.5–14.5)
FENTANYL SCREEN, URINE: NORMAL
GLOBULIN SER CALC-MCNC: 2.4 G/DL (ref 2.3–3.5)
GLUCOSE SERPL-MCNC: 138 MG/DL (ref 70–99)
GLUCOSE UR STRIP-MCNC: NEGATIVE MG/DL
HCT VFR BLD AUTO: 41.2 % (ref 42–52)
HGB BLD-MCNC: 13.5 G/DL (ref 14–18)
HGB UR QL STRIP: NEGATIVE
HYALINE CASTS #/AREA URNS AUTO: ABNORMAL /HPF (ref 0–5)
KETONES UR STRIP-MCNC: ABNORMAL MG/DL
LEUKOCYTE ESTERASE UR QL STRIP: NEGATIVE
LYMPHOCYTES # BLD: 1.1 K/UL (ref 1–4.8)
LYMPHOCYTES NFR BLD: 8.5 %
MCH RBC QN AUTO: 28.2 PG (ref 27–31.3)
MCHC RBC AUTO-ENTMCNC: 32.8 % (ref 33–37)
MCV RBC AUTO: 85.9 FL (ref 79–92.2)
METHADONE UR QL SCN: NORMAL
MONOCYTES # BLD: 0.7 K/UL (ref 0.2–0.8)
MONOCYTES NFR BLD: 5.5 %
NEUTROPHILS # BLD: 11.3 K/UL (ref 1.4–6.5)
NEUTS SEG NFR BLD: 84.8 %
NITRITE UR QL STRIP: NEGATIVE
OPIATES UR QL SCN: NORMAL
OXYCODONE UR QL SCN: NORMAL
PCP UR QL SCN: NORMAL
PH UR STRIP: 5 [PH] (ref 5–9)
PLATELET # BLD AUTO: 287 K/UL (ref 130–400)
POTASSIUM SERPL-SCNC: 3.7 MEQ/L (ref 3.4–4.9)
PROPOXYPH UR QL SCN: NORMAL
PROT SERPL-MCNC: 6.6 G/DL (ref 6.3–8)
PROT UR STRIP-MCNC: 30 MG/DL
RBC # BLD AUTO: 4.79 M/UL (ref 4.7–6.1)
RBC #/AREA URNS AUTO: ABNORMAL /HPF (ref 0–5)
SODIUM SERPL-SCNC: 141 MEQ/L (ref 135–144)
SP GR UR STRIP: 1.03 (ref 1–1.03)
UROBILINOGEN UR STRIP-ACNC: 0.2 E.U./DL
WBC # BLD AUTO: 13.4 K/UL (ref 4.8–10.8)
WBC #/AREA URNS AUTO: ABNORMAL /HPF (ref 0–5)

## 2023-04-16 PROCEDURE — 93005 ELECTROCARDIOGRAM TRACING: CPT | Performed by: EMERGENCY MEDICINE

## 2023-04-16 PROCEDURE — 82550 ASSAY OF CK (CPK): CPT

## 2023-04-16 PROCEDURE — 6370000000 HC RX 637 (ALT 250 FOR IP): Performed by: EMERGENCY MEDICINE

## 2023-04-16 PROCEDURE — 85025 COMPLETE CBC W/AUTO DIFF WBC: CPT

## 2023-04-16 PROCEDURE — 80307 DRUG TEST PRSMV CHEM ANLYZR: CPT

## 2023-04-16 PROCEDURE — 80053 COMPREHEN METABOLIC PANEL: CPT

## 2023-04-16 PROCEDURE — 36415 COLL VENOUS BLD VENIPUNCTURE: CPT

## 2023-04-16 PROCEDURE — 99284 EMERGENCY DEPT VISIT MOD MDM: CPT

## 2023-04-16 PROCEDURE — 70450 CT HEAD/BRAIN W/O DYE: CPT

## 2023-04-16 PROCEDURE — 81001 URINALYSIS AUTO W/SCOPE: CPT

## 2023-04-16 RX ORDER — LEVETIRACETAM 500 MG/1
1000 TABLET ORAL 2 TIMES DAILY
Qty: 60 TABLET | Refills: 3 | Status: SHIPPED | OUTPATIENT
Start: 2023-04-16

## 2023-04-16 RX ORDER — LEVETIRACETAM 500 MG/1
1000 TABLET ORAL 2 TIMES DAILY
Qty: 60 TABLET | Refills: 3 | Status: SHIPPED | OUTPATIENT
Start: 2023-04-16 | End: 2023-04-16 | Stop reason: SDUPTHER

## 2023-04-16 RX ORDER — LEVETIRACETAM 500 MG/1
500 TABLET ORAL ONCE
Status: COMPLETED | OUTPATIENT
Start: 2023-04-16 | End: 2023-04-16

## 2023-04-16 RX ADMIN — LEVETIRACETAM 500 MG: 500 TABLET, FILM COATED ORAL at 18:47

## 2023-04-16 ASSESSMENT — ENCOUNTER SYMPTOMS
NAUSEA: 0
RHINORRHEA: 0
ABDOMINAL PAIN: 0
STRIDOR: 0
VOMITING: 0
SHORTNESS OF BREATH: 0
TROUBLE SWALLOWING: 0
EYES NEGATIVE: 1
WHEEZING: 0
ALLERGIC/IMMUNOLOGIC NEGATIVE: 1

## 2023-04-16 ASSESSMENT — LIFESTYLE VARIABLES
HOW OFTEN DO YOU HAVE A DRINK CONTAINING ALCOHOL: NEVER
HOW MANY STANDARD DRINKS CONTAINING ALCOHOL DO YOU HAVE ON A TYPICAL DAY: PATIENT DOES NOT DRINK

## 2023-04-16 ASSESSMENT — PAIN - FUNCTIONAL ASSESSMENT
PAIN_FUNCTIONAL_ASSESSMENT: NONE - DENIES PAIN
PAIN_FUNCTIONAL_ASSESSMENT: NONE - DENIES PAIN

## 2023-04-17 LAB
EKG ATRIAL RATE: 111 BPM
EKG P AXIS: -5 DEGREES
EKG P-R INTERVAL: 114 MS
EKG Q-T INTERVAL: 328 MS
EKG QRS DURATION: 82 MS
EKG QTC CALCULATION (BAZETT): 446 MS
EKG R AXIS: -16 DEGREES
EKG T AXIS: -14 DEGREES
EKG VENTRICULAR RATE: 111 BPM

## 2023-04-22 PROBLEM — S09.90XA HEAD INJURY: Status: ACTIVE | Noted: 2023-04-22

## 2023-04-24 ENCOUNTER — TELEPHONE (OUTPATIENT)
Dept: NEUROLOGY | Age: 58
End: 2023-04-24

## 2023-04-24 NOTE — TELEPHONE ENCOUNTER
Patient was inpt 4/6 for fall and d/c. Hospital follow up scheduled for 6/7/2023 and patient is requesting for appt sooner due to having two falls. He was in ER at [facility] 4/10/23 for a fall and again 4/16/2023 for breakthrough seizure -Please advise.

## 2023-05-04 DIAGNOSIS — R55 VASOVAGAL SYNCOPE: ICD-10-CM

## 2023-05-11 ENCOUNTER — TELEPHONE (OUTPATIENT)
Dept: CARDIOLOGY CLINIC | Age: 58
End: 2023-05-11

## 2023-05-17 ENCOUNTER — OFFICE VISIT (OUTPATIENT)
Dept: NEUROLOGY | Age: 58
End: 2023-05-17
Payer: COMMERCIAL

## 2023-05-17 VITALS
BODY MASS INDEX: 31.64 KG/M2 | WEIGHT: 196 LBS | DIASTOLIC BLOOD PRESSURE: 78 MMHG | SYSTOLIC BLOOD PRESSURE: 118 MMHG | HEART RATE: 68 BPM

## 2023-05-17 DIAGNOSIS — M62.82 NON-TRAUMATIC RHABDOMYOLYSIS: ICD-10-CM

## 2023-05-17 DIAGNOSIS — R55 VASOVAGAL SYNCOPE: ICD-10-CM

## 2023-05-17 DIAGNOSIS — R56.9 SEIZURE (HCC): Primary | ICD-10-CM

## 2023-05-17 LAB — CK SERPL-CCNC: 144 U/L (ref 0–190)

## 2023-05-17 PROCEDURE — 99214 OFFICE O/P EST MOD 30 MIN: CPT | Performed by: PSYCHIATRY & NEUROLOGY

## 2023-05-17 RX ORDER — ESCITALOPRAM OXALATE 10 MG/1
10 TABLET ORAL DAILY
COMMUNITY
Start: 2023-04-19

## 2023-05-17 ASSESSMENT — ENCOUNTER SYMPTOMS
NAUSEA: 0
BACK PAIN: 0
VOMITING: 0
PHOTOPHOBIA: 0
SHORTNESS OF BREATH: 0
TROUBLE SWALLOWING: 0
COLOR CHANGE: 0
CHOKING: 0

## 2023-05-19 LAB — ALDOLASE SERPL-CCNC: 5.5 U/L (ref 1.2–7.6)

## 2023-06-29 ENCOUNTER — TELEPHONE (OUTPATIENT)
Dept: NEUROLOGY | Age: 58
End: 2023-06-29

## 2023-08-07 RX ORDER — LEVETIRACETAM 500 MG/1
1000 TABLET ORAL 2 TIMES DAILY
Qty: 120 TABLET | Refills: 2 | Status: SHIPPED | OUTPATIENT
Start: 2023-08-07 | End: 2023-11-05

## 2023-08-07 NOTE — TELEPHONE ENCOUNTER
Requested Prescriptions     Pending Prescriptions Disp Refills    levETIRAcetam (KEPPRA) 500 MG tablet 120 tablet 2     Sig: Take 2 tablets by mouth 2 times daily

## 2024-09-15 NOTE — CARE COORDINATION
3w notified of medically clear. Dr. Andressa Holly is rounding.  Awaiting psych eval.
DISPLAY PLAN FREE TEXT